# Patient Record
Sex: FEMALE | Race: OTHER | NOT HISPANIC OR LATINO | URBAN - METROPOLITAN AREA
[De-identification: names, ages, dates, MRNs, and addresses within clinical notes are randomized per-mention and may not be internally consistent; named-entity substitution may affect disease eponyms.]

---

## 2019-05-02 ENCOUNTER — INPATIENT (INPATIENT)
Facility: HOSPITAL | Age: 39
LOS: 3 days | Discharge: HOME | DRG: 378 | End: 2019-05-06
Attending: FAMILY MEDICINE | Admitting: INTERNAL MEDICINE
Payer: COMMERCIAL

## 2019-05-02 VITALS
HEART RATE: 125 BPM | OXYGEN SATURATION: 100 % | SYSTOLIC BLOOD PRESSURE: 92 MMHG | TEMPERATURE: 99 F | WEIGHT: 125 LBS | RESPIRATION RATE: 16 BRPM | DIASTOLIC BLOOD PRESSURE: 64 MMHG | HEIGHT: 60 IN

## 2019-05-02 DIAGNOSIS — Z29.9 ENCOUNTER FOR PROPHYLACTIC MEASURES, UNSPECIFIED: ICD-10-CM

## 2019-05-02 DIAGNOSIS — K92.2 GASTROINTESTINAL HEMORRHAGE, UNSPECIFIED: ICD-10-CM

## 2019-05-02 DIAGNOSIS — R11.10 VOMITING, UNSPECIFIED: ICD-10-CM

## 2019-05-02 DIAGNOSIS — E78.2 MIXED HYPERLIPIDEMIA: ICD-10-CM

## 2019-05-02 DIAGNOSIS — R73.9 HYPERGLYCEMIA, UNSPECIFIED: ICD-10-CM

## 2019-05-02 DIAGNOSIS — R10.9 UNSPECIFIED ABDOMINAL PAIN: ICD-10-CM

## 2019-05-02 DIAGNOSIS — E87.1 HYPO-OSMOLALITY AND HYPONATREMIA: ICD-10-CM

## 2019-05-02 LAB
ALBUMIN SERPL ELPH-MCNC: 3.5 G/DL — SIGNIFICANT CHANGE UP (ref 3.3–5)
ALP SERPL-CCNC: 82 U/L — SIGNIFICANT CHANGE UP (ref 40–120)
ALT FLD-CCNC: SIGNIFICANT CHANGE UP U/L (ref 12–78)
AMYLASE P1 CFR SERPL: 28 U/L — SIGNIFICANT CHANGE UP (ref 25–115)
ANION GAP SERPL CALC-SCNC: 21 MMOL/L — HIGH (ref 5–17)
AST SERPL-CCNC: SIGNIFICANT CHANGE UP U/L (ref 15–37)
BASOPHILS # BLD AUTO: 0.01 K/UL — SIGNIFICANT CHANGE UP (ref 0–0.2)
BASOPHILS NFR BLD AUTO: 0.3 % — SIGNIFICANT CHANGE UP (ref 0–2)
BILIRUB SERPL-MCNC: 1.2 MG/DL — SIGNIFICANT CHANGE UP (ref 0.2–1.2)
BUN SERPL-MCNC: 12 MG/DL — SIGNIFICANT CHANGE UP (ref 7–23)
CALCIUM SERPL-MCNC: SIGNIFICANT CHANGE UP MG/DL (ref 8.5–10.1)
CHLORIDE SERPL-SCNC: 95 MMOL/L — LOW (ref 96–108)
CO2 SERPL-SCNC: 13 MMOL/L — LOW (ref 22–31)
CREAT SERPL-MCNC: 0.53 MG/DL — SIGNIFICANT CHANGE UP (ref 0.5–1.3)
EOSINOPHIL # BLD AUTO: 0.02 K/UL — SIGNIFICANT CHANGE UP (ref 0–0.5)
EOSINOPHIL NFR BLD AUTO: 0.6 % — SIGNIFICANT CHANGE UP (ref 0–6)
FLU A RESULT: SIGNIFICANT CHANGE UP
FLU A RESULT: SIGNIFICANT CHANGE UP
FLUAV AG NPH QL: SIGNIFICANT CHANGE UP
FLUBV AG NPH QL: SIGNIFICANT CHANGE UP
GLUCOSE SERPL-MCNC: 194 MG/DL — HIGH (ref 70–99)
HCG SERPL-ACNC: <1 MIU/ML — SIGNIFICANT CHANGE UP
HCT VFR BLD CALC: 27.6 % — LOW (ref 34.5–45)
HCT VFR BLD CALC: 37.3 % — SIGNIFICANT CHANGE UP (ref 34.5–45)
HGB BLD-MCNC: 12.5 G/DL — SIGNIFICANT CHANGE UP (ref 11.5–15.5)
HGB BLD-MCNC: 9 G/DL — LOW (ref 11.5–15.5)
IMM GRANULOCYTES NFR BLD AUTO: 0 % — SIGNIFICANT CHANGE UP (ref 0–1.5)
LACTATE SERPL-SCNC: 0.5 MMOL/L — LOW (ref 0.7–2)
LIDOCAIN IGE QN: 154 U/L — SIGNIFICANT CHANGE UP (ref 73–393)
LYMPHOCYTES # BLD AUTO: 0.21 K/UL — LOW (ref 1–3.3)
LYMPHOCYTES # BLD AUTO: 5.8 % — LOW (ref 13–44)
MCHC RBC-ENTMCNC: 32.5 PG — SIGNIFICANT CHANGE UP (ref 27–34)
MCHC RBC-ENTMCNC: 32.6 GM/DL — SIGNIFICANT CHANGE UP (ref 32–36)
MCHC RBC-ENTMCNC: 32.9 PG — SIGNIFICANT CHANGE UP (ref 27–34)
MCHC RBC-ENTMCNC: 33.5 GM/DL — SIGNIFICANT CHANGE UP (ref 32–36)
MCV RBC AUTO: 98.2 FL — SIGNIFICANT CHANGE UP (ref 80–100)
MCV RBC AUTO: 99.6 FL — SIGNIFICANT CHANGE UP (ref 80–100)
MONOCYTES # BLD AUTO: 0.34 K/UL — SIGNIFICANT CHANGE UP (ref 0–0.9)
MONOCYTES NFR BLD AUTO: 9.4 % — SIGNIFICANT CHANGE UP (ref 2–14)
NEUTROPHILS # BLD AUTO: 3.03 K/UL — SIGNIFICANT CHANGE UP (ref 1.8–7.4)
NEUTROPHILS NFR BLD AUTO: 83.3 % — HIGH (ref 43–77)
NRBC # BLD: 0 /100 WBCS — SIGNIFICANT CHANGE UP (ref 0–0)
NRBC # BLD: 0 /100 WBCS — SIGNIFICANT CHANGE UP (ref 0–0)
PLATELET # BLD AUTO: 139 K/UL — LOW (ref 150–400)
PLATELET # BLD AUTO: 144 K/UL — LOW (ref 150–400)
POTASSIUM SERPL-MCNC: 3.6 MMOL/L — SIGNIFICANT CHANGE UP (ref 3.5–5.3)
POTASSIUM SERPL-SCNC: 3.6 MMOL/L — SIGNIFICANT CHANGE UP (ref 3.5–5.3)
PROT SERPL-MCNC: 6.6 G/DL — SIGNIFICANT CHANGE UP (ref 6–8.3)
RBC # BLD: 2.77 M/UL — LOW (ref 3.8–5.2)
RBC # BLD: 3.8 M/UL — SIGNIFICANT CHANGE UP (ref 3.8–5.2)
RBC # FLD: 13.5 % — SIGNIFICANT CHANGE UP (ref 10.3–14.5)
RBC # FLD: 13.8 % — SIGNIFICANT CHANGE UP (ref 10.3–14.5)
RSV RESULT: SIGNIFICANT CHANGE UP
RSV RNA RESP QL NAA+PROBE: SIGNIFICANT CHANGE UP
SODIUM SERPL-SCNC: 129 MMOL/L — LOW (ref 135–145)
WBC # BLD: 3.71 K/UL — LOW (ref 3.8–10.5)
WBC # BLD: 4.36 K/UL — SIGNIFICANT CHANGE UP (ref 3.8–10.5)
WBC # FLD AUTO: 3.71 K/UL — LOW (ref 3.8–10.5)
WBC # FLD AUTO: 4.36 K/UL — SIGNIFICANT CHANGE UP (ref 3.8–10.5)

## 2019-05-02 PROCEDURE — 99223 1ST HOSP IP/OBS HIGH 75: CPT | Mod: GC,AI

## 2019-05-02 PROCEDURE — 74177 CT ABD & PELVIS W/CONTRAST: CPT | Mod: 26

## 2019-05-02 PROCEDURE — 99285 EMERGENCY DEPT VISIT HI MDM: CPT

## 2019-05-02 RX ORDER — IOHEXOL 300 MG/ML
30 INJECTION, SOLUTION INTRAVENOUS ONCE
Qty: 0 | Refills: 0 | Status: COMPLETED | OUTPATIENT
Start: 2019-05-02 | End: 2019-05-02

## 2019-05-02 RX ORDER — SUCRALFATE 1 G
1 TABLET ORAL
Qty: 0 | Refills: 0 | Status: DISCONTINUED | OUTPATIENT
Start: 2019-05-02 | End: 2019-05-06

## 2019-05-02 RX ORDER — SODIUM CHLORIDE 9 MG/ML
1000 INJECTION INTRAMUSCULAR; INTRAVENOUS; SUBCUTANEOUS ONCE
Qty: 0 | Refills: 0 | Status: COMPLETED | OUTPATIENT
Start: 2019-05-02 | End: 2019-05-02

## 2019-05-02 RX ORDER — PANTOPRAZOLE SODIUM 20 MG/1
40 TABLET, DELAYED RELEASE ORAL ONCE
Qty: 0 | Refills: 0 | Status: DISCONTINUED | OUTPATIENT
Start: 2019-05-02 | End: 2019-05-02

## 2019-05-02 RX ORDER — PANTOPRAZOLE SODIUM 20 MG/1
40 TABLET, DELAYED RELEASE ORAL
Qty: 0 | Refills: 0 | Status: DISCONTINUED | OUTPATIENT
Start: 2019-05-02 | End: 2019-05-02

## 2019-05-02 RX ORDER — FAMOTIDINE 10 MG/ML
20 INJECTION INTRAVENOUS ONCE
Qty: 0 | Refills: 0 | Status: COMPLETED | OUTPATIENT
Start: 2019-05-02 | End: 2019-05-02

## 2019-05-02 RX ORDER — MORPHINE SULFATE 50 MG/1
2 CAPSULE, EXTENDED RELEASE ORAL EVERY 6 HOURS
Qty: 0 | Refills: 0 | Status: DISCONTINUED | OUTPATIENT
Start: 2019-05-02 | End: 2019-05-04

## 2019-05-02 RX ORDER — SODIUM CHLORIDE 9 MG/ML
1000 INJECTION INTRAMUSCULAR; INTRAVENOUS; SUBCUTANEOUS
Qty: 0 | Refills: 0 | Status: DISCONTINUED | OUTPATIENT
Start: 2019-05-02 | End: 2019-05-02

## 2019-05-02 RX ORDER — ACETAMINOPHEN 500 MG
650 TABLET ORAL EVERY 6 HOURS
Qty: 0 | Refills: 0 | Status: DISCONTINUED | OUTPATIENT
Start: 2019-05-02 | End: 2019-05-06

## 2019-05-02 RX ORDER — MORPHINE SULFATE 50 MG/1
1 CAPSULE, EXTENDED RELEASE ORAL EVERY 6 HOURS
Qty: 0 | Refills: 0 | Status: DISCONTINUED | OUTPATIENT
Start: 2019-05-02 | End: 2019-05-04

## 2019-05-02 RX ORDER — CIPROFLOXACIN LACTATE 400MG/40ML
400 VIAL (ML) INTRAVENOUS ONCE
Qty: 0 | Refills: 0 | Status: COMPLETED | OUTPATIENT
Start: 2019-05-02 | End: 2019-05-02

## 2019-05-02 RX ORDER — ONDANSETRON 8 MG/1
4 TABLET, FILM COATED ORAL EVERY 6 HOURS
Qty: 0 | Refills: 0 | Status: DISCONTINUED | OUTPATIENT
Start: 2019-05-02 | End: 2019-05-06

## 2019-05-02 RX ORDER — METRONIDAZOLE 500 MG
TABLET ORAL
Qty: 0 | Refills: 0 | Status: DISCONTINUED | OUTPATIENT
Start: 2019-05-02 | End: 2019-05-05

## 2019-05-02 RX ORDER — METRONIDAZOLE 500 MG
500 TABLET ORAL EVERY 8 HOURS
Qty: 0 | Refills: 0 | Status: DISCONTINUED | OUTPATIENT
Start: 2019-05-03 | End: 2019-05-05

## 2019-05-02 RX ORDER — METRONIDAZOLE 500 MG
500 TABLET ORAL ONCE
Qty: 0 | Refills: 0 | Status: COMPLETED | OUTPATIENT
Start: 2019-05-02 | End: 2019-05-02

## 2019-05-02 RX ORDER — CIPROFLOXACIN LACTATE 400MG/40ML
VIAL (ML) INTRAVENOUS
Qty: 0 | Refills: 0 | Status: DISCONTINUED | OUTPATIENT
Start: 2019-05-02 | End: 2019-05-05

## 2019-05-02 RX ORDER — SODIUM CHLORIDE 9 MG/ML
1000 INJECTION INTRAMUSCULAR; INTRAVENOUS; SUBCUTANEOUS
Qty: 0 | Refills: 0 | Status: DISCONTINUED | OUTPATIENT
Start: 2019-05-02 | End: 2019-05-06

## 2019-05-02 RX ORDER — LANOLIN ALCOHOL/MO/W.PET/CERES
5 CREAM (GRAM) TOPICAL ONCE
Qty: 0 | Refills: 0 | Status: COMPLETED | OUTPATIENT
Start: 2019-05-02 | End: 2019-05-02

## 2019-05-02 RX ORDER — FAMOTIDINE 10 MG/ML
20 INJECTION INTRAVENOUS
Qty: 0 | Refills: 0 | Status: DISCONTINUED | OUTPATIENT
Start: 2019-05-02 | End: 2019-05-06

## 2019-05-02 RX ORDER — CIPROFLOXACIN LACTATE 400MG/40ML
400 VIAL (ML) INTRAVENOUS EVERY 12 HOURS
Qty: 0 | Refills: 0 | Status: DISCONTINUED | OUTPATIENT
Start: 2019-05-03 | End: 2019-05-05

## 2019-05-02 RX ADMIN — SODIUM CHLORIDE 1000 MILLILITER(S): 9 INJECTION INTRAMUSCULAR; INTRAVENOUS; SUBCUTANEOUS at 13:44

## 2019-05-02 RX ADMIN — Medication 200 MILLIGRAM(S): at 21:32

## 2019-05-02 RX ADMIN — Medication 650 MILLIGRAM(S): at 17:36

## 2019-05-02 RX ADMIN — Medication 650 MILLIGRAM(S): at 23:44

## 2019-05-02 RX ADMIN — IOHEXOL 30 MILLILITER(S): 300 INJECTION, SOLUTION INTRAVENOUS at 14:15

## 2019-05-02 RX ADMIN — Medication 5 MILLIGRAM(S): at 22:00

## 2019-05-02 RX ADMIN — Medication 1 GRAM(S): at 17:29

## 2019-05-02 RX ADMIN — SODIUM CHLORIDE 1000 MILLILITER(S): 9 INJECTION INTRAMUSCULAR; INTRAVENOUS; SUBCUTANEOUS at 12:44

## 2019-05-02 RX ADMIN — FAMOTIDINE 20 MILLIGRAM(S): 10 INJECTION INTRAVENOUS at 12:44

## 2019-05-02 RX ADMIN — Medication 100 MILLIGRAM(S): at 19:58

## 2019-05-02 RX ADMIN — SODIUM CHLORIDE 1000 MILLILITER(S): 9 INJECTION INTRAMUSCULAR; INTRAVENOUS; SUBCUTANEOUS at 14:40

## 2019-05-02 RX ADMIN — Medication 650 MILLIGRAM(S): at 18:06

## 2019-05-02 RX ADMIN — SODIUM CHLORIDE 100 MILLILITER(S): 9 INJECTION INTRAMUSCULAR; INTRAVENOUS; SUBCUTANEOUS at 21:34

## 2019-05-02 RX ADMIN — SODIUM CHLORIDE 1000 MILLILITER(S): 9 INJECTION INTRAMUSCULAR; INTRAVENOUS; SUBCUTANEOUS at 13:40

## 2019-05-02 RX ADMIN — FAMOTIDINE 20 MILLIGRAM(S): 10 INJECTION INTRAVENOUS at 21:32

## 2019-05-02 NOTE — H&P ADULT - NSHPOUTPATIENTPROVIDERS_GEN_ALL_CORE
[facility]. Morris at [facility]  Pharmacy: [facility]

## 2019-05-02 NOTE — H&P ADULT - NSHPPHYSICALEXAM_GEN_ALL_CORE
Physical Exam:  General: Well developed, well nourished, NAD  HEENT: NCAT, PERRLA, EOMI bl, moist mucous membranes   Neck: Supple, nontender, no mass  Neurology: A&Ox3, nonfocal, CN II-XII intact, sensation intact, no gait abnormalities   Respiratory: CTA B/L, No W/R/R  CV: RRR, +S1/S2, no murmurs, rubs or gallops  Abdominal: Soft, NT, ND +BSx4  Extremities: No C/C/E, + peripheral pulses  MSK: Normal ROM, no joint erythema or warmth, no joint swelling   Skin: warm, dry, tattoo on back, umbilicus jewelry

## 2019-05-02 NOTE — H&P ADULT - HISTORY OF PRESENT ILLNESS
38 F PMHx of elevated triglycerides presents to ED c/o of abdominal pain. Patient states she awoke at 8:00 a.m., and noticed some moderate abdominal pain and took a total 400 mg advil. An hour later, she felt feverish (but did not take temp) with shaking, body aches and chills, and she ate a small amount of her sandwich but vomited 30 minutes later. Patient vomited a second time hours later, and this time noticed a cup of bright red blood. Denies recent illness, travel, eating raw meat/fish, cough, SOB, chest pain, diarrhea or constipation. Note patient unsure of LMP due to Mirena placement 3 years ago and only has irregular spotting on occasion.     In the ED:  -VS: T: 98.9 HR: 125-> 118 BP: 92/64 -> 107/72  -s/p 2L NS bolus  -20 mg IV famotadine  -EKG Sinus tach @ 120. Personally reviewed: No ST or T wave changes, no S1Q3T3, no LVH 38 F PMHx of elevated triglycerides presents to ED c/o of abdominal pain. Patient states she awoke at 8:00 a.m., and noticed some moderate abdominal pain and took a total 400 mg advil. An hour later, she felt feverish (but did not take temp) with shaking, body aches and chills, and she ate a small amount of her sandwich but vomited 30 minutes later. Patient vomited a second time hours later, and this time noticed a cup of bright red blood. Denies recent illness, travel, eating raw meat/fish, cough, SOB, chest pain, diarrhea or constipation. Note patient unsure of LMP due to Mirena placement 3 years ago and only has irregular spotting on occasion.     In the ED:  -VS: T: 98.9 HR: 125-> 118 BP: 92/64 -> 107/72 WBC: wnl  -BMP wnl except Na: 129  -s/p 2L NS bolus  -20 mg IV famotadine  -EKG Sinus tach @ 120. Personally reviewed: No ST or T wave changes, no S1Q3T3, no LVH  -Flu negative

## 2019-05-02 NOTE — CONSULT NOTE ADULT - SUBJECTIVE AND OBJECTIVE BOX
Chief Complaint:  Patient is a 38y old  Female who presents with a chief complaint of  abd pain    Seasonal allergies  No significant past surgical history     HPI: 37yo female presented to ed w abd pain and possible coffee ground emesis.    recent vs/labs/imaging reviewed- afebrile tachy soft bps  labs pending  no imaging      No Known Allergies      sodium chloride 0.9% Bolus 1000 milliLiter(s) IV Bolus once        FAMILY HISTORY:        Review of Systems:    General:  No wt loss, fevers, chills, night sweats, fatigue  Eyes:  Good vision, no reported pain  ENT:  No sore throat, pain, runny nose, dysphagia  CV:  No pain, palpitations, no lightheadedness  Resp:  No dyspnea, cough, tachypnea, wheezing  GI: see above  :  No pain, bleeding, incontinence, nocturia  Muscle:  No pain, weakness  Neuro:  No weakness, tingling, memory problems  Psych:  No fatigue, insomnia, mood problems, depression  Endocrine:  No polyuria, polydypsia, cold/heat intolerance  Heme:  No petechiae, ecchymosis, easy bruisability  Skin:  No rash, tattoos, scars, edema    Relevant Family History:   n/c    Relevant Social History: n/c      Physical Exam:    Vital Signs:  Vital Signs Last 24 Hrs  T(C): 37.2 (02 May 2019 11:52), Max: 37.2 (02 May 2019 11:52)  T(F): 98.9 (02 May 2019 11:52), Max: 98.9 (02 May 2019 11:52)  HR: 125 (02 May 2019 11:52) (125 - 125)  BP: 92/64 (02 May 2019 11:52) (92/64 - 92/64)  BP(mean): --  RR: 16 (02 May 2019 11:52) (16 - 16)  SpO2: 100% (02 May 2019 11:52) (100% - 100%)  Daily Height in cm: 152.4 (02 May 2019 11:52)    Daily     General:  Appears stated age, well-groomed, nad  HEENT:  NC/AT,  conjunctivae clear and pink, no thyromegaly, nodules, adenopathy, no JVD  Chest:  Full & symmetric excursion, no increased effort, breath sounds clear  Cardiovascular:  Regular rhythm, S1, S2, no murmur/rub/S3/S4, no abdominal bruit, no edema  Abdomen:  Soft, non-tender, non-distended, normoactive bowel sounds,  no masses ,no hepatosplenomeagaly, no signs of chronic liver disease  Extremities:  no cyanosis,clubbing or edema  Skin:  No rash/erythema/ecchymoses/petechiae/wounds/abscess/warm/dry  Neuro/Psych:  A&O  , no asterixis, no tremor, no encephalopathy    Laboratory:        05-02    129<L>  |  95<L>  |  x   ----------------------------<  194<H>  3.6   |  13<L>  |  0.53              Amylase Serum28      Lipase [id number]       Ammonia--    Imaging:    none Chief Complaint:  Patient is a 38y old  Female who presents with a chief complaint of  abd pain    Seasonal allergies  No significant past surgical history     HPI: 39 yo healthy female, developed body aches x one day and then this morning developed lower abdominal cramps followed by vomiting of all of breakfast. Shortly thereafter took Motrin followed by some more Advil. Went to work and then vomited few times with BRB admixed with some of her food from breakfast. At this time patient is feeling a little weak. denies any fever, chills, cough, diarrhea, melena, brbpr and no cough or SOB. Never had this before.    gi consulted for ?hematemesis and abd pain. chart reviewed. pt seen and examined. yesterday in [facility], woke up this morning felt overall unwell w chills and body aches. took nsaid had breakfast went to work, had 1 episode of emesis mostly food. took motrin and later vomited again this time brb. c/o associated nausea and lq abd cramps. last bm yesterday and normal. 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 denies fevers/d/c. denies prior gi hx, known pud, liver/gb/pancreatic dz. denies recent travel, hospitalizations, abx use, dietary changes (states she usually eats light, grazes, bland foods). no prior abd sx. has never had egd/colon. denies toxic habits, only social etoh use, no recent inc use. fhx nc. takes nsaids prn for pain, no ac/anti plts. upon eval, no further vomiting or abd cramps, but c/o chills and visibly rigoring.    recent vs/labs/imaging reviewed- afebrile tachy soft bps  labs pending  no imaging      No Known Allergies      sodium chloride 0.9% Bolus 1000 milliLiter(s) IV Bolus once        FAMILY HISTORY:        Review of Systems:    General:   chills  Eyes:  Good vision, no reported pain  ENT:  No sore throat, pain, runny nose, dysphagia  CV:  No pain, palpitations, no lightheadedness  Resp:  No dyspnea, cough, tachypnea, wheezing  GI: see above  :  No pain, bleeding, incontinence, nocturia  Muscle:  No pain, weakness  Neuro:  No weakness, tingling, memory problems  Psych:  No fatigue, insomnia, mood problems, depression  Endocrine:  No polyuria, polydypsia, cold/heat intolerance  Heme:  No petechiae, ecchymosis, easy bruisability  Skin:  No rash, tattoos, scars, edema    Relevant Family History:   n/c    Relevant Social History: n/c      Physical Exam:    Vital Signs:  Vital Signs Last 24 Hrs  T(C): 37.2 (02 May 2019 11:52), Max: 37.2 (02 May 2019 11:52)  T(F): 98.9 (02 May 2019 11:52), Max: 98.9 (02 May 2019 11:52)  HR: 125 (02 May 2019 11:52) (125 - 125)  BP: 92/64 (02 May 2019 11:52) (92/64 - 92/64)  BP(mean): --  RR: 16 (02 May 2019 11:52) (16 - 16)  SpO2: 100% (02 May 2019 11:52) (100% - 100%)  Daily Height in cm: 152.4 (02 May 2019 11:52)    Daily     General:  uncomfortable appearing +rigors  HEENT:  NC/AT  Chest:  dec bs  Cardiovascular:  s1s2 tachy  Abdomen:  Soft, epigastric ttp mild dt +bs  Extremities:  no edema  Skin:  No rash  Neuro/Psych:  A&O x3    Laboratory:        05-02    129<L>  |  95<L>  |  x   ----------------------------<  194<H>  3.6   |  13<L>  |  0.53              Amylase Serum28      Lipase [id number]       Ammonia--    Imaging:    pending

## 2019-05-02 NOTE — ED PROVIDER NOTE - CONSTITUTIONAL, MLM
normal... Well appearing, young female, well nourished, awake, alert, oriented to person, place, time/situation and in mild apparent distress.

## 2019-05-02 NOTE — H&P ADULT - NSHPREVIEWOFSYSTEMS_GEN_ALL_CORE
Constitutional: denies fever, diaphoresis admits chills  HEENT: denies blurry vision, difficulty hearing  Respiratory: denies SOB, JANG, cough, sputum production, wheezing, hemoptysis  Cardiovascular: denies CP, palpitations, edema  Gastrointestinal: Admits nausea, admits vomiting admits abdominal pain denies diarrhea, constipation   Genitourinary: denies dysuria, frequency, urgency, hematuria   Musculoskeletal: denies myalgias, joint swelling, muscle weakness  Neurologic: denies headache, weakness, dizziness, paresthesias, numbness/tingling  Psychiatric: denies feeling anxious, depressed, suicidal, homicidal thoughts  Hematology/Oncology: denies bruising, tender or enlarged lymph nodes   ROS negative except as noted above

## 2019-05-02 NOTE — ED ADULT NURSE NOTE - NSIMPLEMENTINTERV_GEN_ALL_ED
Implemented All Universal Safety Interventions:  [place] to call system. Call bell, personal items and telephone within reach. Instruct patient to call for assistance. Room bathroom lighting operational. Non-slip footwear when patient is off stretcher. Physically safe environment: no spills, clutter or unnecessary equipment. Stretcher in lowest position, wheels locked, appropriate side rails in place.

## 2019-05-02 NOTE — H&P ADULT - PROBLEM SELECTOR PLAN 1
-Patient Hb dropped from 12.5 to 9.0  -F/U STAT CT abd pelvis with contrast   -Diet NPO  -GI sideridis, endoscopy in a.m.   -Type and Screen  -Zofran q6h PRN nausea  -Pain scale: 650 tylenol mild, 1 mg IV morphine mod, 2 mg IV severe  -F/U fecal occult  -Continue pepcid 20 mg IV daily, carafate -Patient Hb dropped from 12.5 to 9.0  -F/U STAT CT abd pelvis with contrast   -Diet NPO  -GI sideridis, endoscopy in a.m.   -Type and Screen  -Zofran q6h PRN nausea  -Pain scale: 650 tylenol mild, 1 mg IV morphine mod, 2 mg IV severe  -F/U fecal occult  -Continue pepcid 20 mg IV daily, carafate  -Note flu negative

## 2019-05-02 NOTE — ED ADULT NURSE NOTE - OBJECTIVE STATEMENT
Pt presents to the Ed, s/p lower abd cramping after drinking a bottle of water ( her routine), ate toast, took Advil for the pain, went to work, felt achy all over, denies cough, then took a Motrin, vomited once, undigested food, brown. Pt then vomited once again, noticed blood. EKG done, pt placed on cardiac monitor. Call bell within reach.

## 2019-05-02 NOTE — ED PROVIDER NOTE - OBJECTIVE STATEMENT
37 yo healthy female, developed body aches x one day and then this morning developed lower abdominal cramps followed by vomiting of all of breakfast. Shortly thereafter took Motrin followed by some more Advil. Went to work and then vomited few times with BRB admixed with some of her food from breakfast. At this time patient is feeling a little weak. denies any fever, chills, cough, diarrhea, melena, brbpr and no cough or SOB. Never had this before.

## 2019-05-02 NOTE — CONSULT NOTE ADULT - PROBLEM SELECTOR RECOMMENDATION 2
reported hematemesis, no further episodes while in ed  f/u cbc, f/u ct a/p  monitor h/h, transfuse prn  cont ppi/carafate  hold ac asa nsaids  may need egd  icu eval if decompensates    above plan dw attg, agreeable; dw ed

## 2019-05-02 NOTE — H&P ADULT - PROBLEM SELECTOR PLAN 2
-Patient states she was on atorvastatin (20 mg) but she was nauseated from it so stopped  -F/U Lipid panel

## 2019-05-02 NOTE — H&P ADULT - NSHPSOCIALHISTORY_GEN_ALL_CORE
smoked 1/4 ppd for 3 years. No etoh, illicit drug use. Ambulates independently. No recent travel. 3 children . , and currently engaged.       HCM: No recent pap smear in 6 years, Mirena placement 3 years ago.

## 2019-05-02 NOTE — ED ADULT TRIAGE NOTE - CHIEF COMPLAINT QUOTE
"I woke up this morning with severe cramping in my stomach and my whole body was really cold. I just threw up and there was blood"

## 2019-05-02 NOTE — ED PROVIDER NOTE - CLINICAL SUMMARY MEDICAL DECISION MAKING FREE TEXT BOX
Lower abdominal pains with hematemesis requiring evaluation, labs and IVFS/Meds and GI consult with Dr. Madrid (Adriane andrade)

## 2019-05-03 DIAGNOSIS — K92.0 HEMATEMESIS: ICD-10-CM

## 2019-05-03 DIAGNOSIS — K52.9 NONINFECTIVE GASTROENTERITIS AND COLITIS, UNSPECIFIED: ICD-10-CM

## 2019-05-03 DIAGNOSIS — E78.1 PURE HYPERGLYCERIDEMIA: ICD-10-CM

## 2019-05-03 LAB
ANION GAP SERPL CALC-SCNC: 13 MMOL/L — SIGNIFICANT CHANGE UP (ref 5–17)
ANION GAP SERPL CALC-SCNC: 14 MMOL/L — SIGNIFICANT CHANGE UP (ref 5–17)
APPEARANCE UR: CLEAR — SIGNIFICANT CHANGE UP
BILIRUB UR-MCNC: NEGATIVE — SIGNIFICANT CHANGE UP
BUN SERPL-MCNC: 4 MG/DL — LOW (ref 7–23)
BUN SERPL-MCNC: 6 MG/DL — LOW (ref 7–23)
CALCIUM SERPL-MCNC: 6.2 MG/DL — CRITICAL LOW (ref 8.5–10.1)
CALCIUM SERPL-MCNC: 7.2 MG/DL — LOW (ref 8.5–10.1)
CHLORIDE SERPL-SCNC: 100 MMOL/L — SIGNIFICANT CHANGE UP (ref 96–108)
CHLORIDE SERPL-SCNC: 99 MMOL/L — SIGNIFICANT CHANGE UP (ref 96–108)
CHOLEST SERPL-MCNC: 789 MG/DL — HIGH (ref 10–199)
CO2 SERPL-SCNC: 20 MMOL/L — LOW (ref 22–31)
CO2 SERPL-SCNC: 21 MMOL/L — LOW (ref 22–31)
COLOR SPEC: YELLOW — SIGNIFICANT CHANGE UP
CREAT SERPL-MCNC: 0.62 MG/DL — SIGNIFICANT CHANGE UP (ref 0.5–1.3)
CREAT SERPL-MCNC: 0.63 MG/DL — SIGNIFICANT CHANGE UP (ref 0.5–1.3)
DIFF PNL FLD: NEGATIVE — SIGNIFICANT CHANGE UP
ETHANOL SERPL-MCNC: <12 MG/DL — HIGH (ref 0–10)
GLUCOSE SERPL-MCNC: 148 MG/DL — HIGH (ref 70–99)
GLUCOSE SERPL-MCNC: 154 MG/DL — HIGH (ref 70–99)
GLUCOSE UR QL: NEGATIVE — SIGNIFICANT CHANGE UP
HBA1C BLD-MCNC: 5.1 % — SIGNIFICANT CHANGE UP (ref 4–5.6)
HCG UR QL: NEGATIVE — SIGNIFICANT CHANGE UP
HCT VFR BLD CALC: 30.9 % — LOW (ref 34.5–45)
HDLC SERPL-MCNC: 5 MG/DL — LOW
HGB BLD-MCNC: 11.1 G/DL — LOW (ref 11.5–15.5)
KETONES UR-MCNC: ABNORMAL
LEUKOCYTE ESTERASE UR-ACNC: ABNORMAL
LIPID PNL WITH DIRECT LDL SERPL: SIGNIFICANT CHANGE UP MG/DL
MCHC RBC-ENTMCNC: 34.8 PG — HIGH (ref 27–34)
MCHC RBC-ENTMCNC: 35.9 GM/DL — SIGNIFICANT CHANGE UP (ref 32–36)
MCV RBC AUTO: 96.9 FL — SIGNIFICANT CHANGE UP (ref 80–100)
NITRITE UR-MCNC: NEGATIVE — SIGNIFICANT CHANGE UP
NRBC # BLD: 0 /100 WBCS — SIGNIFICANT CHANGE UP (ref 0–0)
PH UR: 5 — SIGNIFICANT CHANGE UP (ref 5–8)
PLATELET # BLD AUTO: 129 K/UL — LOW (ref 150–400)
POTASSIUM SERPL-MCNC: 3 MMOL/L — LOW (ref 3.5–5.3)
POTASSIUM SERPL-MCNC: 3.1 MMOL/L — LOW (ref 3.5–5.3)
POTASSIUM SERPL-SCNC: 3 MMOL/L — LOW (ref 3.5–5.3)
POTASSIUM SERPL-SCNC: 3.1 MMOL/L — LOW (ref 3.5–5.3)
PROT UR-MCNC: NEGATIVE — SIGNIFICANT CHANGE UP
RBC # BLD: 3.19 M/UL — LOW (ref 3.8–5.2)
RBC # FLD: 13.9 % — SIGNIFICANT CHANGE UP (ref 10.3–14.5)
SODIUM SERPL-SCNC: 133 MMOL/L — LOW (ref 135–145)
SODIUM SERPL-SCNC: 134 MMOL/L — LOW (ref 135–145)
SP GR SPEC: 1.01 — SIGNIFICANT CHANGE UP (ref 1.01–1.02)
TOTAL CHOLESTEROL/HDL RATIO MEASUREMENT: 157.8 RATIO — HIGH (ref 3.3–7.1)
TRIGL SERPL-MCNC: 4232 MG/DL — HIGH (ref 10–149)
TRIGL SERPL-MCNC: 5150 MG/DL — HIGH (ref 10–149)
UROBILINOGEN FLD QL: NEGATIVE — SIGNIFICANT CHANGE UP
WBC # BLD: 4.79 K/UL — SIGNIFICANT CHANGE UP (ref 3.8–10.5)
WBC # FLD AUTO: 4.79 K/UL — SIGNIFICANT CHANGE UP (ref 3.8–10.5)

## 2019-05-03 PROCEDURE — 99291 CRITICAL CARE FIRST HOUR: CPT

## 2019-05-03 PROCEDURE — 99222 1ST HOSP IP/OBS MODERATE 55: CPT

## 2019-05-03 PROCEDURE — 99233 SBSQ HOSP IP/OBS HIGH 50: CPT | Mod: GC

## 2019-05-03 RX ORDER — LACTOBACILLUS ACIDOPHILUS 100MM CELL
1 CAPSULE ORAL THREE TIMES A DAY
Qty: 0 | Refills: 0 | Status: DISCONTINUED | OUTPATIENT
Start: 2019-05-03 | End: 2019-05-06

## 2019-05-03 RX ORDER — LANOLIN ALCOHOL/MO/W.PET/CERES
5 CREAM (GRAM) TOPICAL ONCE
Qty: 0 | Refills: 0 | Status: COMPLETED | OUTPATIENT
Start: 2019-05-03 | End: 2019-05-03

## 2019-05-03 RX ORDER — POTASSIUM CHLORIDE 20 MEQ
40 PACKET (EA) ORAL EVERY 4 HOURS
Qty: 0 | Refills: 0 | Status: COMPLETED | OUTPATIENT
Start: 2019-05-03 | End: 2019-05-03

## 2019-05-03 RX ORDER — FENOFIBRATE,MICRONIZED 130 MG
145 CAPSULE ORAL DAILY
Qty: 0 | Refills: 0 | Status: DISCONTINUED | OUTPATIENT
Start: 2019-05-03 | End: 2019-05-06

## 2019-05-03 RX ORDER — POTASSIUM CHLORIDE 20 MEQ
10 PACKET (EA) ORAL
Qty: 0 | Refills: 0 | Status: COMPLETED | OUTPATIENT
Start: 2019-05-03 | End: 2019-05-03

## 2019-05-03 RX ORDER — CALCIUM GLUCONATE 100 MG/ML
1 VIAL (ML) INTRAVENOUS ONCE
Qty: 0 | Refills: 0 | Status: COMPLETED | OUTPATIENT
Start: 2019-05-03 | End: 2019-05-03

## 2019-05-03 RX ORDER — POTASSIUM PHOSPHATE, MONOBASIC POTASSIUM PHOSPHATE, DIBASIC 236; 224 MG/ML; MG/ML
15 INJECTION, SOLUTION INTRAVENOUS ONCE
Qty: 0 | Refills: 0 | Status: COMPLETED | OUTPATIENT
Start: 2019-05-03 | End: 2019-05-03

## 2019-05-03 RX ORDER — FENOFIBRATE,MICRONIZED 130 MG
48 CAPSULE ORAL DAILY
Qty: 0 | Refills: 0 | Status: DISCONTINUED | OUTPATIENT
Start: 2019-05-03 | End: 2019-05-03

## 2019-05-03 RX ORDER — MAGNESIUM SULFATE 500 MG/ML
2 VIAL (ML) INJECTION ONCE
Qty: 0 | Refills: 0 | Status: COMPLETED | OUTPATIENT
Start: 2019-05-03 | End: 2019-05-03

## 2019-05-03 RX ORDER — OMEGA-3 ACID ETHYL ESTERS 1 G
2 CAPSULE ORAL
Qty: 0 | Refills: 0 | Status: DISCONTINUED | OUTPATIENT
Start: 2019-05-03 | End: 2019-05-06

## 2019-05-03 RX ADMIN — Medication 100 MILLIGRAM(S): at 15:03

## 2019-05-03 RX ADMIN — FAMOTIDINE 20 MILLIGRAM(S): 10 INJECTION INTRAVENOUS at 19:13

## 2019-05-03 RX ADMIN — Medication 1 GRAM(S): at 21:32

## 2019-05-03 RX ADMIN — Medication 100 MILLIEQUIVALENT(S): at 12:32

## 2019-05-03 RX ADMIN — Medication 40 MILLIEQUIVALENT(S): at 10:16

## 2019-05-03 RX ADMIN — Medication 200 GRAM(S): at 07:03

## 2019-05-03 RX ADMIN — Medication 100 MILLIGRAM(S): at 21:32

## 2019-05-03 RX ADMIN — Medication 40 MILLIEQUIVALENT(S): at 15:03

## 2019-05-03 RX ADMIN — Medication 1 TABLET(S): at 15:03

## 2019-05-03 RX ADMIN — Medication 50 GRAM(S): at 10:17

## 2019-05-03 RX ADMIN — Medication 5 MILLIGRAM(S): at 23:34

## 2019-05-03 RX ADMIN — Medication 650 MILLIGRAM(S): at 09:00

## 2019-05-03 RX ADMIN — Medication 200 MILLIGRAM(S): at 06:26

## 2019-05-03 RX ADMIN — Medication 100 MILLIGRAM(S): at 05:22

## 2019-05-03 RX ADMIN — Medication 1 TABLET(S): at 21:32

## 2019-05-03 RX ADMIN — Medication 650 MILLIGRAM(S): at 05:47

## 2019-05-03 RX ADMIN — Medication 2 GRAM(S): at 19:13

## 2019-05-03 RX ADMIN — Medication 100 MILLIEQUIVALENT(S): at 10:15

## 2019-05-03 RX ADMIN — Medication 100 MILLIEQUIVALENT(S): at 07:59

## 2019-05-03 RX ADMIN — Medication 650 MILLIGRAM(S): at 08:00

## 2019-05-03 RX ADMIN — FAMOTIDINE 20 MILLIGRAM(S): 10 INJECTION INTRAVENOUS at 05:21

## 2019-05-03 RX ADMIN — POTASSIUM PHOSPHATE, MONOBASIC POTASSIUM PHOSPHATE, DIBASIC 62.5 MILLIMOLE(S): 236; 224 INJECTION, SOLUTION INTRAVENOUS at 10:17

## 2019-05-03 RX ADMIN — Medication 200 MILLIGRAM(S): at 19:13

## 2019-05-03 RX ADMIN — Medication 145 MILLIGRAM(S): at 15:03

## 2019-05-03 NOTE — DISCHARGE NOTE PROVIDER - NSDCCPCAREPLAN_GEN_ALL_CORE_FT
PRINCIPAL DISCHARGE DIAGNOSIS  Diagnosis: Gastrointestinal hemorrhage, unspecified gastrointestinal hemorrhage type  Assessment and Plan of Treatment:       SECONDARY DISCHARGE DIAGNOSES  Diagnosis: Colitis  Assessment and Plan of Treatment: You were diagnosed with colitis and given antibiotics. Continue medications as prescribed and follow up with your PCP in 1 week.    Diagnosis: Hypertriglyceridemia  Assessment and Plan of Treatment: You were diagnosed with hypertriglyceridemia. Continue medications as prescribed. Follow up with your PCP in 1 week. PRINCIPAL DISCHARGE DIAGNOSIS  Diagnosis: Gastrointestinal hemorrhage, unspecified gastrointestinal hemorrhage type  Assessment and Plan of Treatment: You were admitted to the hospital to rule out a GI bleed. You were seen by GI, Dr. Alanis. An upper endoscopy showed __________. Please continue the current medication regimen as prescribed. Follow up with your primary care doctor, Dr. Morris within one week of discharge.      SECONDARY DISCHARGE DIAGNOSES  Diagnosis: Hypertriglyceridemia  Assessment and Plan of Treatment: You were diagnosed with hypertriglyceridemia. Continue medications as prescribed. Follow up with your PCP within 2 weeks. You will need to have a repeat lipid panel and liver function test at that time.    Diagnosis: Colitis  Assessment and Plan of Treatment: You were diagnosed with colitis and given antibiotics. Continue your antibiotics cipro and Flagyl until 05/07/2019. Follow up with your PCP in 2 weeks PRINCIPAL DISCHARGE DIAGNOSIS  Diagnosis: Gastrointestinal hemorrhage, unspecified gastrointestinal hemorrhage type  Assessment and Plan of Treatment: You were admitted to the hospital to rule out a GI bleed. You were seen by GI, Dr. Alanis. An upper endoscopy showed a hiatal hernia and gastritis (no intervention necessary at this time). Please continue the current medication regimen as prescribed. Follow up with your primary care doctor, Dr. Morris within one week of discharge.      SECONDARY DISCHARGE DIAGNOSES  Diagnosis: Colitis  Assessment and Plan of Treatment: You were diagnosed with colitis and given antibiotics. Continue your antibiotics cipro and Flagyl until 05/07/2019. Follow up with your PCP in 2 weeks    Diagnosis: Hypertriglyceridemia  Assessment and Plan of Treatment: You were diagnosed with hypertriglyceridemia. Continue medications as prescribed. Follow up with your PCP within 2 weeks. You will need to have a repeat lipid panel and liver function test at that time. PRINCIPAL DISCHARGE DIAGNOSIS  Diagnosis: Gastrointestinal hemorrhage, unspecified gastrointestinal hemorrhage type  Assessment and Plan of Treatment: You were admitted to the hospital to rule out a GI bleed. You were seen by GI, Dr. Alanis. An upper endoscopy showed a hiatal hernia and gastritis (no intervention necessary at this time). Please continue the current medication regimen as prescribed. Follow up with your primary care doctor, Dr. Morris within one week of discharge.      SECONDARY DISCHARGE DIAGNOSES  Diagnosis: Colitis  Assessment and Plan of Treatment: You were diagnosed with colitis and given antibiotics. Continue your antibiotics cipro and Flagyl until 05/07/2019. Follow up with your PCP in 1weeks    Diagnosis: Hypertriglyceridemia  Assessment and Plan of Treatment: You were diagnosed with hypertriglyceridemia. Continue medications as prescribed. Follow up with your PCP within  1 to 2 weeks. You will need to have a repeat lipid panel and liver function test at that time.

## 2019-05-03 NOTE — DISCHARGE NOTE PROVIDER - HOSPITAL COURSE
38 F with a PMHX of hypertriglyceridemia presented with abdominal pain and 2 episodes of hemoptysis admitted to telemetry unit for further work up. Cardiology (Dr. Castillo) Consulted given triglycerides >5000. Patient started on fenofibrate and fish oil. GI, Dr. Alanis, consulted given chief complaint of possible GI bleed, with EGD pending. Patient found to be hyponatremic, hypokalemic and hypocalcemic with electrolytes replaced. Patient evaluated by ICU given triglyceride numbers. 38 F PMHx of elevated triglycerides presents to ED c/o of abdominal pain. Patient stated she awoke at 8:00 a.m., and noticed some moderate abdominal pain and took a total 400 mg advil. An hour later, she felt feverish (but did not take temp) with shaking, body aches and chills, and she ate a small amount of her sandwich but vomited 30 minutes later. Patient vomited a second time hours later, and this time noticed a cup of bright red blood. Denied recent illness, travel, eating raw meat/fish, cough, SOB, chest pain, diarrhea or constipation. Note patient unsure of LMP due to Mirena placement 3 years ago and only has irregular spotting on occasion.             The patient was admitted to r/o GI hemorrhage. The patient was seen by GI, Dr. Alanis. The patient was found to be FOBT negative. CT abdomen showed an incidental finding of colitis. She was started on cipro/Flagyl. Stool culture was negative. Diarrhea has since resolved. The patient also complained of hematemesis prior to admission. No episodes noted on this admission. The patient was started on Pepcid, carafate, and Zofran PRN.  The patient tolerated po diet. EGD was performed showing ____________. The patient also has a history of familial hypertriglyceridemia. The patient was started on Lovaza, fenofibrate, gemfibrozil, and a statin. Lipid panel trended downward. LFTs remained within normal limits. Cardiology, Dr. Castillo was consulted for hypertriglyceridemia and cardiac clearance for EGD. The patient was cleared by cardiology. The patient is stable for discharge with close outpatient follow up with her primary care doctor, Dr. Morris        Vital Signs Last 24 Hrs    T(C): 36.6 (06 May 2019 06:00), Max: 36.6 (05 May 2019 12:20)    T(F): 97.9 (06 May 2019 06:00), Max: 97.9 (06 May 2019 06:00)    HR: 66 (06 May 2019 06:00) (63 - 82)    BP: 98/64 (06 May 2019 06:00) (98/64 - 117/83)    BP(mean): --    RR: 17 (06 May 2019 06:00) (16 - 17)    SpO2: 99% (06 May 2019 06:00) (98% - 99%) 38 F PMHx of elevated triglycerides presents to ED c/o of abdominal pain. Patient stated she awoke at 8:00 a.m., and noticed some moderate abdominal pain and took a total 400 mg advil. An hour later, she felt feverish (but did not take temp) with shaking, body aches and chills, and she ate a small amount of her sandwich but vomited 30 minutes later. Patient vomited a second time hours later, and this time noticed a cup of bright red blood. Denied recent illness, travel, eating raw meat/fish, cough, SOB, chest pain, diarrhea or constipation. Note patient unsure of LMP due to Mirena placement 3 years ago and only has irregular spotting on occasion.             The patient was admitted to r/o GI hemorrhage. The patient was seen by GI, Dr. Alanis. The patient was found to be FOBT negative. CT abdomen showed an incidental finding of colitis. She was started on cipro/Flagyl. Stool culture was negative. Diarrhea has since resolved. The patient also complained of hematemesis prior to admission. No episodes noted on this admission. The patient was started on Pepcid, carafate, and Zofran PRN.  The patient tolerated po diet. EGD was performed showing hiatal hernia and gastritis. The patient also has a history of familial hypertriglyceridemia. The patient was started on Lovaza, fenofibrate, gemfibrozil, and a statin. Lipid panel trended downward. LFTs remained within normal limits. Cardiology, Dr. Castillo was consulted for hypertriglyceridemia and cardiac clearance for EGD. The patient was cleared by cardiology. The patient is stable for discharge with close outpatient follow up with her primary care doctor, Dr. Morris        Vital Signs Last 24 Hrs    T(C): 36.6 (06 May 2019 06:00), Max: 36.6 (05 May 2019 12:20)    T(F): 97.9 (06 May 2019 06:00), Max: 97.9 (06 May 2019 06:00)    HR: 66 (06 May 2019 06:00) (63 - 82)    BP: 98/64 (06 May 2019 06:00) (98/64 - 117/83)    BP(mean): --    RR: 17 (06 May 2019 06:00) (16 - 17)    SpO2: 99% (06 May 2019 06:00) (98% - 99%) 38 F PMHx of elevated triglycerides presents to ED c/o of abdominal pain. Patient stated she awoke at 8:00 a.m., and noticed some moderate abdominal pain and took a total 400 mg advil. An hour later, she felt feverish (but did not take temp) with shaking, body aches and chills, and she ate a small amount of her sandwich but vomited 30 minutes later. Patient vomited a second time hours later, and this time noticed a cup of bright red blood. Denied recent illness, travel, eating raw meat/fish, cough, SOB, chest pain, diarrhea or constipation. Note patient unsure of LMP due to Mirena placement 3 years ago and only has irregular spotting on occasion.         The patient was admitted to r/o GI hemorrhage  with hematemesis .        with hx severe hypertriglyceridemia   , hosp course     The patient was seen by GI, Dr. Alanis. The patient was found to be FOBT negative. CT abdomen showed an incidental finding of colitis. She was started on iv  cipro/Flagyl. Stool culture was negative. Diarrhea has since resolved. The patient also complained of hematemesis prior to admission. No episodes noted on this admission. The patient was started on Pepcid, carafate, and Zofran PRN.  pt also hx     Elevated triglycerides with high cholesterol Patient states she was on atorvastatin (20 mg)     but she was nauseated from it so stopped taking since January.     -Triglycerides >5000, repeat  started  on meds lopid  trended down     ICU consult ed , possible IV insulin and plasmapheresis but said not a candidate     -Cardio consulted, will start fenofibrate 145 mg daily and fish oil 2g BID    -Patient denying current abdominal pain/nausea in hospital course      will do mrcp if need  or symptom  dylan as per gi  dr gandara gp .     pt found to be     Hyponatremia  on hypovolumic  -Na 134  - now 136 , possibly from sec to  vomiting  dehydration - resolved .    -Continue IV Fluids @ 100 cc/hr as npo midnight .  hypomagnesia ,hypocalcemia and hypokalemia replaced - stable , ca. k, mag.         : Elevated blood sugar  but   -No Hx of DM. hb a1c 5.1 .         -pt went for  endoscopy  on ppi , npo except meds , medically optimize for endoscopy .    diarrhea  with colitis   resolved  symptom -  in cipro iv , iv flagyl   switch to po abx  in am    stool cult neg .     he patient tolerated po diet. EGD was performed showing hiatal hernia and gastritis  no active gi bleed noticed  so ruled out this time ,  as per gi dr woody ids ok to be dc home no need ppi .     The patient also has a history of familial hypertriglyceridemia. The patient is  started on Lovaza, fenofibrate, gemfibrozil, and a statin need to continue meds  and fu out pt lipid profile .     Lipid panel trended downward. LFTs remained within normal limits.  The patient was cleared by cardiology dr valenzuela gp  and gi dr gandara gp ,     The patient is stable for discharge with close outpatient follow up with her primary care doctor, Dr. Morris.     physical day of dc     Vital Signs Last 24 Hrs    T(C): 36.6 (06 May 2019 06:00), Max: 36.6 (05 May 2019 12:20)    T(F): 97.9 (06 May 2019 06:00), Max: 97.9 (06 May 2019 06:00)    HR: 66 (06 May 2019 06:00) (63 - 82)    BP: 98/64 (06 May 2019 06:00) (98/64 - 117/83)    BP(mean): --    RR: 17 (06 May 2019 06:00) (16 - 17)    SpO2: 99% (06 May 2019 06:00) (98% - 99%)    physical exam 5-6-19  day of dc    Vital Signs Last 24 Hrs    T(C): 36.3 (06 May 2019 13:44), Max: 36.6 (06 May 2019 06:00)    T(F): 97.3 (06 May 2019 13:44), Max: 97.9 (06 May 2019 06:00)    HR: 70 (06 May 2019 13:44) (63 - 82)    BP: 105/80 (06 May 2019 13:44) (98/64 - 117/83)    BP(mean): --    RR: 17 (06 May 2019 13:44) (16 - 18)    SpO2: 100% (06 May 2019 13:44) (99% - 100%)      GEN no distress , HEENT nt/nc , perrla , CVS s1s2 no tachy , CHEST bl air entery  present  no wheez , no rale  ,     CNS aao/3  , no focal deficit , GI soft , bs present  , no tenderness , EXT no edema, pedal pulse present , SKIN no rash.

## 2019-05-03 NOTE — PROGRESS NOTE ADULT - PROBLEM SELECTOR PLAN 1
-Patient Hb dropped from 12.5 to 9.0, 11.1 today   -CT Abdomen/pelvis consistent with colitis, hepatic steatosis, hepatomegaly, mesenteric lymph nodes. No contrast extravasation  -Diet NPO  -Type and Screen  -Zofran q6h PRN nausea  -Pain scale: 650 tylenol mild, 1 mg IV morphine mod, 2 mg IV severe  -Patient declines fecal occult  -Continue pepcid 20 mg IV daily, carafate  -Note flu negative  -Patient unstable for EGD today given hypertriglyceridemia, F/U GI note -Patient Hb dropped from 12.5 to 9.0, 11.1 today   -CT Abdomen/pelvis consistent with colitis, hepatic steatosis, hepatomegaly, mesenteric lymph nodes. No contrast extravasation. Patient started on cipro/flagly  -Diet NPO  -Type and Screen  -Zofran q6h PRN nausea  -Pain scale: 650 tylenol mild, 1 mg IV morphine mod, 2 mg IV severe  -Patient declines fecal occult  -Continue pepcid 20 mg IV daily, carafate  -Note flu negative  -Patient unstable for EGD today given hypertriglyceridemia, F/U GI note -Patient Hb dropped from 12.5 to 9.0, 11.1 today   -CT Abdomen/pelvis consistent with colitis, hepatic steatosis, hepatomegaly, mesenteric lymph nodes. No contrast extravasation. Patient started on  iv cipro/flagly  -Diet NPO  -Type and Screen  -Zofran q6h PRN nausea  -Pain scale: 650 tylenol mild, 1 mg IV morphine mod, 2 mg IV severe  -Patient declines fecal occult  -Continue pepcid 20 mg IV daily, Carafate  endo Monday

## 2019-05-03 NOTE — DISCHARGE NOTE PROVIDER - NSDCFUADDAPPT_GEN_ALL_CORE_FT
fu your pmd dr richter with in 1 wk , need lipid  profile and lft repeat  lab with in 1 to 2wk again .

## 2019-05-03 NOTE — DISCHARGE NOTE PROVIDER - CARE PROVIDER_API CALL
Arturo,   [facility] [address]  Phone: [phone]  Fax: [phone]   )    -  Follow Up Time: 1 week    Ernst Alanis (DO)  Gastroenterology; Internal Medicine  [address] [place] [address]  Phone: [phone]  Fax: [phone]  Follow Up Time: 1 week

## 2019-05-03 NOTE — CONSULT NOTE ADULT - SUBJECTIVE AND OBJECTIVE BOX
[facility] - Adolfo Nicholson, Jorge, Chau, Phyllis, Leonora Castillo  Office Number: [phone]    Initial Consult Note    CHIEF COMPLAINT: Patient is a 38y old  Female who presents with a chief complaint of GI bleed (02 May 2019 16:35)      HPI:  38 F PMHx of elevated triglycerides presents to ED c/o of abdominal pain. Patient states she awoke at 8:00 a.m., and noticed some moderate abdominal pain and took a total 400 mg advil. An hour later, she felt feverish (but did not take temp) with shaking, body aches and chills, and she ate a small amount of her sandwich but vomited 30 minutes later. Patient vomited a second time hours later, and this time noticed a cup of bright red blood. Denies recent illness, travel, eating raw meat/fish, cough, SOB, chest pain, diarrhea or constipation. Note patient unsure of LMP due to Mirena placement 3 years ago and only has irregular spotting on occasion.     In the ED:  -VS: T: 98.9 HR: 125-> 118 BP: 92/64 -> 107/72 WBC: wnl  -BMP wnl except Na: 129  -s/p 2L NS bolus  -20 mg IV famotadine  -EKG Sinus tach @ 120. Personally reviewed: No ST or T wave changes, no S1Q3T3, no LVH  -Flu negative (02 May 2019 16:35)    Interval History: Patient seen and examined at the bedside. She has had no acute cardiac events on monitor, has been in sinus rhythm. Denies CP, Palpitaitons. Denies any cardiac history including MI, CVA, Stents, arrhythmias, CABG. No history of smoking or illicit drug use. Denies family history of CVD or sudden cardiac death. She was sick x 1 day, had + bloody vomiting and nausea, denies history of bleeding disorders or GI bleed in past.      PAST MEDICAL & SURGICAL HISTORY:  Normal vaginal delivery: 3  High triglycerides  Seasonal allergies  No significant past surgical history      SOCIAL HISTORY:  No tobacco, ethanol, or drug abuse.    FAMILY HISTORY:    No family history of acute MI or sudden cardiac death.    MEDICATIONS  (STANDING):  ciprofloxacin   IVPB 400 milliGRAM(s) IV Intermittent every 12 hours  ciprofloxacin   IVPB      famotidine Injectable 20 milliGRAM(s) IV Push two times a day  metroNIDAZOLE  IVPB      metroNIDAZOLE  IVPB 500 milliGRAM(s) IV Intermittent every 8 hours  potassium chloride    Tablet ER 40 milliEquivalent(s) Oral every 4 hours  potassium chloride  10 mEq/100 mL IVPB 10 milliEquivalent(s) IV Intermittent every 1 hour  sodium chloride 0.9%. 1000 milliLiter(s) (100 mL/Hr) IV Continuous <Continuous>  sucralfate 1 Gram(s) Oral two times a day    MEDICATIONS  (PRN):  acetaminophen   Tablet .. 650 milliGRAM(s) Oral every 6 hours PRN Temp greater or equal to 38C (100.4F)  acetaminophen   Tablet .. 650 milliGRAM(s) Oral every 6 hours PRN Mild Pain (1 - 3)  morphine  - Injectable 1 milliGRAM(s) IV Push every 6 hours PRN Moderate Pain (4 - 6)  morphine  - Injectable 2 milliGRAM(s) IV Push every 6 hours PRN Severe Pain (7 - 10)  ondansetron Injectable 4 milliGRAM(s) IV Push every 6 hours PRN Nausea and/or Vomiting      Allergies    No Known Allergies    Intolerances        REVIEW OF SYSTEMS:    CONSTITUTIONAL: No weakness, fevers or chills  EYES/ENT: No visual changes;  No vertigo or throat pain   NECK: No pain or stiffness  RESPIRATORY: No cough, wheezing, hemoptysis; No shortness of breath  CARDIOVASCULAR: No chest pain or palpitations  GASTROINTESTINAL: + abdominal pain. No nausea, vomiting, or hematemesis; No diarrhea or constipation. No melena or hematochezia.  GENITOURINARY: No dysuria, frequency or hematuria  NEUROLOGICAL: No numbness or weakness  SKIN: No itching or rash  All other review of systems is negative unless indicated above    VITAL SIGNS:   Vital Signs Last 24 Hrs  T(C): 37.3 (03 May 2019 07:29), Max: 38.3 (02 May 2019 17:21)  T(F): 99.2 (03 May 2019 07:29), Max: 100.9 (02 May 2019 17:21)  HR: 80 (03 May 2019 07:29) (80 - 125)  BP: 107/71 (03 May 2019 07:29) (92/64 - 108/74)  BP(mean): --  RR: 18 (03 May 2019 07:29) (15 - 18)  SpO2: 98% (03 May 2019 07:29) (96% - 100%)    I&O's Summary    02 May 2019 07:01  -  03 May 2019 07:00  --------------------------------------------------------  IN: 1520 mL / OUT: 0 mL / NET: 1520 mL        On Exam:    Constitutional: NAD, alert and oriented x 3  Lungs:  Non-labored, breath sounds are clear bilaterally, No wheezing, rales or rhonchi  Cardiovascular: RRR.  S1 and S2 positive.  No murmurs, rubs, gallops or clicks  Gastrointestinal: mild tenderness diffusely, Bowel Sounds present, soft, nondistended.   Lymph: No peripheral edema. No cervical lymphadenopathy.  Neurological: Alert, no focal deficits  Skin: No rashes or ulcers   Psych:  Mood & affect appropriate.    LABS: All Labs Reviewed:                        11.1   4.79  )-----------( 129      ( 03 May 2019 05:27 )             30.9                         9.0    3.71  )-----------( 139      ( 02 May 2019 14:23 )             27.6                         12.5   4.36  )-----------( 144      ( 02 May 2019 12:41 )             37.3     03 May 2019 05:27    134    |  99     |  6      ----------------------------<  154    3.1     |  21     |  0.63   02 May 2019 12:41    129    |  95     |  12     ----------------------------<  194    3.6     |  13     |  0.53     Ca    6.2        03 May 2019 05:27  Ca    see note      02 May 2019 12:41  Phos  1.8       03 May 2019 05:27  Mg     1.1       03 May 2019 05:27    TPro  6.6    /  Alb  3.5    /  TBili  1.2    /  DBili  x      /  AST  see note  /  ALT  see note  /  AlkPhos  82     02 May 2019 12:41          Blood Culture:         RADIOLOGY:    EKG: [facility] - Adolfo Nicholson, Jorge, Chau, Phyllis, Leonora Castillo  Office Number: [phone]    Initial Consult Note    CHIEF COMPLAINT: Patient is a 38y old  Female who presents with a chief complaint of GI bleed (02 May 2019 16:35)      HPI:  38 F PMHx of elevated triglycerides presents to ED c/o of abdominal pain. Patient states she awoke at 8:00 a.m., and noticed some moderate abdominal pain and took a total 400 mg advil. An hour later, she felt feverish (but did not take temp) with shaking, body aches and chills, and she ate a small amount of her sandwich but vomited 30 minutes later. Patient vomited a second time hours later, and this time noticed a cup of bright red blood. Denies recent illness, travel, eating raw meat/fish, cough, SOB, chest pain, diarrhea or constipation. Note patient unsure of LMP due to Mirena placement 3 years ago and only has irregular spotting on occasion.     In the ED:  -VS: T: 98.9 HR: 125-> 118 BP: 92/64 -> 107/72 WBC: wnl  -BMP wnl except Na: 129  -s/p 2L NS bolus  -20 mg IV famotadine  -EKG Sinus tach @ 120. Personally reviewed: No ST or T wave changes, no S1Q3T3, no LVH  -Flu negative (02 May 2019 16:35)    Interval History: Patient seen and examined at the bedside. She has had no acute cardiac events on monitor, has been in sinus rhythm. Denies CP, Palpitaitons. She was febrile overnight as well. Denies any cardiac history including MI, CVA, Stents, arrhythmias, CABG. No history of smoking or illicit drug use. Denies family history of CVD or sudden cardiac death. She was sick x 1 day, had + bloody vomiting and nausea, denies history of bleeding disorders or GI bleed in past.      PAST MEDICAL & SURGICAL HISTORY:  Normal vaginal delivery: 3  High triglycerides  Seasonal allergies  No significant past surgical history      SOCIAL HISTORY:  No tobacco, ethanol, or drug abuse.    FAMILY HISTORY:    No family history of acute MI or sudden cardiac death.    MEDICATIONS  (STANDING):  ciprofloxacin   IVPB 400 milliGRAM(s) IV Intermittent every 12 hours  ciprofloxacin   IVPB      famotidine Injectable 20 milliGRAM(s) IV Push two times a day  metroNIDAZOLE  IVPB      metroNIDAZOLE  IVPB 500 milliGRAM(s) IV Intermittent every 8 hours  potassium chloride    Tablet ER 40 milliEquivalent(s) Oral every 4 hours  potassium chloride  10 mEq/100 mL IVPB 10 milliEquivalent(s) IV Intermittent every 1 hour  sodium chloride 0.9%. 1000 milliLiter(s) (100 mL/Hr) IV Continuous <Continuous>  sucralfate 1 Gram(s) Oral two times a day    MEDICATIONS  (PRN):  acetaminophen   Tablet .. 650 milliGRAM(s) Oral every 6 hours PRN Temp greater or equal to 38C (100.4F)  acetaminophen   Tablet .. 650 milliGRAM(s) Oral every 6 hours PRN Mild Pain (1 - 3)  morphine  - Injectable 1 milliGRAM(s) IV Push every 6 hours PRN Moderate Pain (4 - 6)  morphine  - Injectable 2 milliGRAM(s) IV Push every 6 hours PRN Severe Pain (7 - 10)  ondansetron Injectable 4 milliGRAM(s) IV Push every 6 hours PRN Nausea and/or Vomiting      Allergies    No Known Allergies    Intolerances        REVIEW OF SYSTEMS:    CONSTITUTIONAL: No weakness, fevers or chills  EYES/ENT: No visual changes;  No vertigo or throat pain   NECK: No pain or stiffness  RESPIRATORY: No cough, wheezing, hemoptysis; No shortness of breath  CARDIOVASCULAR: No chest pain or palpitations  GASTROINTESTINAL: + abdominal pain. No nausea, vomiting, or hematemesis; No diarrhea or constipation. No melena or hematochezia.  GENITOURINARY: No dysuria, frequency or hematuria  NEUROLOGICAL: No numbness or weakness  SKIN: No itching or rash  All other review of systems is negative unless indicated above    VITAL SIGNS:   Vital Signs Last 24 Hrs  T(C): 37.3 (03 May 2019 07:29), Max: 38.3 (02 May 2019 17:21)  T(F): 99.2 (03 May 2019 07:29), Max: 100.9 (02 May 2019 17:21)  HR: 80 (03 May 2019 07:29) (80 - 125)  BP: 107/71 (03 May 2019 07:29) (92/64 - 108/74)  BP(mean): --  RR: 18 (03 May 2019 07:29) (15 - 18)  SpO2: 98% (03 May 2019 07:29) (96% - 100%)    I&O's Summary    02 May 2019 07:01  -  03 May 2019 07:00  --------------------------------------------------------  IN: 1520 mL / OUT: 0 mL / NET: 1520 mL        On Exam:    Constitutional: NAD, alert and oriented x 3  Lungs:  Non-labored, breath sounds are clear bilaterally, No wheezing, rales or rhonchi  Cardiovascular: RRR.  S1 and S2 positive.  No murmurs, rubs, gallops or clicks  Gastrointestinal: mild tenderness diffusely, Bowel Sounds present, soft, nondistended.   Lymph: No peripheral edema. No cervical lymphadenopathy.  Neurological: Alert, no focal deficits  Skin: No rashes or ulcers   Psych:  Mood & affect appropriate.    LABS: All Labs Reviewed:                        11.1   4.79  )-----------( 129      ( 03 May 2019 05:27 )             30.9                         9.0    3.71  )-----------( 139      ( 02 May 2019 14:23 )             27.6                         12.5   4.36  )-----------( 144      ( 02 May 2019 12:41 )             37.3     03 May 2019 05:27    134    |  99     |  6      ----------------------------<  154    3.1     |  21     |  0.63   02 May 2019 12:41    129    |  95     |  12     ----------------------------<  194    3.6     |  13     |  0.53     Ca    6.2        03 May 2019 05:27  Ca    see note      02 May 2019 12:41  Phos  1.8       03 May 2019 05:27  Mg     1.1       03 May 2019 05:27    TPro  6.6    /  Alb  3.5    /  TBili  1.2    /  DBili  x      /  AST  see note  /  ALT  see note  /  AlkPhos  82     02 May 2019 12:41          Blood Culture:         RADIOLOGY:    EKG: [facility] - Adolfo Nicholson, Jorge, Chau, Phyllis, Leonora Castillo  Office Number: [phone]    Initial Consult Note    CHIEF COMPLAINT: Patient is a 38y old  Female who presents with a chief complaint of GI bleed (02 May 2019 16:35)      HPI:  38 F PMHx of elevated triglycerides presents to ED c/o of abdominal pain. Patient states she awoke at 8:00 a.m., and noticed some moderate abdominal pain and took a total 400 mg advil. An hour later, she felt feverish (but did not take temp) with shaking, body aches and chills, and she ate a small amount of her sandwich but vomited 30 minutes later. Patient vomited a second time hours later, and this time noticed a cup of bright red blood. Denies recent illness, travel, eating raw meat/fish, cough, SOB, chest pain, diarrhea or constipation. Note patient unsure of LMP due to Mirena placement 3 years ago and only has irregular spotting on occasion.     In the ED:  -VS: T: 98.9 HR: 125-> 118 BP: 92/64 -> 107/72 WBC: wnl  -BMP wnl except Na: 129  -s/p 2L NS bolus  -20 mg IV famotadine  -EKG Sinus tach @ 120. Personally reviewed: No ST or T wave changes, no S1Q3T3, no LVH  -Flu negative (02 May 2019 16:35)    Interval History: Patient seen and examined at the bedside. She has had no acute cardiac events on monitor, has been in sinus rhythm. Denies CP, Palpitaitons. She was febrile overnight as well. Denies any cardiac history including MI, CVA, Stents, arrhythmias, CABG. No history of smoking or illicit drug use. Denies family history of CVD or sudden cardiac death. She was sick x 1 day, had + bloody vomiting and nausea, denies history of bleeding disorders or GI bleed in past.      PAST MEDICAL & SURGICAL HISTORY:  Normal vaginal delivery: 3  High triglycerides  Seasonal allergies  No significant past surgical history      SOCIAL HISTORY:  No tobacco, ethanol, or drug abuse.    FAMILY HISTORY:    No family history of acute MI or sudden cardiac death.    MEDICATIONS  (STANDING):  ciprofloxacin   IVPB 400 milliGRAM(s) IV Intermittent every 12 hours  ciprofloxacin   IVPB      famotidine Injectable 20 milliGRAM(s) IV Push two times a day  metroNIDAZOLE  IVPB      metroNIDAZOLE  IVPB 500 milliGRAM(s) IV Intermittent every 8 hours  potassium chloride    Tablet ER 40 milliEquivalent(s) Oral every 4 hours  potassium chloride  10 mEq/100 mL IVPB 10 milliEquivalent(s) IV Intermittent every 1 hour  sodium chloride 0.9%. 1000 milliLiter(s) (100 mL/Hr) IV Continuous <Continuous>  sucralfate 1 Gram(s) Oral two times a day    MEDICATIONS  (PRN):  acetaminophen   Tablet .. 650 milliGRAM(s) Oral every 6 hours PRN Temp greater or equal to 38C (100.4F)  acetaminophen   Tablet .. 650 milliGRAM(s) Oral every 6 hours PRN Mild Pain (1 - 3)  morphine  - Injectable 1 milliGRAM(s) IV Push every 6 hours PRN Moderate Pain (4 - 6)  morphine  - Injectable 2 milliGRAM(s) IV Push every 6 hours PRN Severe Pain (7 - 10)  ondansetron Injectable 4 milliGRAM(s) IV Push every 6 hours PRN Nausea and/or Vomiting      Allergies    No Known Allergies    Intolerances        REVIEW OF SYSTEMS:    CONSTITUTIONAL: No weakness, fevers or chills  EYES/ENT: No visual changes;  No vertigo or throat pain   NECK: No pain or stiffness  RESPIRATORY: No cough, wheezing, hemoptysis; No shortness of breath  CARDIOVASCULAR: No chest pain or palpitations  GASTROINTESTINAL: + abdominal pain. No nausea, vomiting, or hematemesis; No diarrhea or constipation. No melena or hematochezia.  GENITOURINARY: No dysuria, frequency or hematuria  NEUROLOGICAL: No numbness or weakness  SKIN: No itching or rash  All other review of systems is negative unless indicated above    VITAL SIGNS:   Vital Signs Last 24 Hrs  T(C): 37.3 (03 May 2019 07:29), Max: 38.3 (02 May 2019 17:21)  T(F): 99.2 (03 May 2019 07:29), Max: 100.9 (02 May 2019 17:21)  HR: 80 (03 May 2019 07:29) (80 - 125)  BP: 107/71 (03 May 2019 07:29) (92/64 - 108/74)  BP(mean): --  RR: 18 (03 May 2019 07:29) (15 - 18)  SpO2: 98% (03 May 2019 07:29) (96% - 100%)    I&O's Summary    02 May 2019 07:01  -  03 May 2019 07:00  --------------------------------------------------------  IN: 1520 mL / OUT: 0 mL / NET: 1520 mL        On Exam:    Constitutional: NAD, alert and oriented x 3  HEENT dry MM, anicteric  Lungs:  Non-labored, breath sounds are clear bilaterally, No wheezing, rales or rhonchi  Cardiovascular: RRR.  S1 and S2 positive.  No murmurs, rubs, gallops or clicks  Gastrointestinal: mild tenderness diffusely, Bowel Sounds present, soft, nondistended.   Lymph: No peripheral edema. No cervical lymphadenopathy.  Neurological: Alert, no focal deficits  Skin: No rashes or ulcers   Psych:  Mood & affect appropriate.    LABS: All Labs Reviewed:                        11.1   4.79  )-----------( 129      ( 03 May 2019 05:27 )             30.9                         9.0    3.71  )-----------( 139      ( 02 May 2019 14:23 )             27.6                         12.5   4.36  )-----------( 144      ( 02 May 2019 12:41 )             37.3     03 May 2019 05:27    134    |  99     |  6      ----------------------------<  154    3.1     |  21     |  0.63   02 May 2019 12:41    129    |  95     |  12     ----------------------------<  194    3.6     |  13     |  0.53     Ca    6.2        03 May 2019 05:27  Ca    see note      02 May 2019 12:41  Phos  1.8       03 May 2019 05:27  Mg     1.1       03 May 2019 05:27    TPro  6.6    /  Alb  3.5    /  TBili  1.2    /  DBili  x      /  AST  see note  /  ALT  see note  /  AlkPhos  82     02 May 2019 12:41          Blood Culture:         RADIOLOGY:    EKG:

## 2019-05-03 NOTE — CONSULT NOTE ADULT - ATTENDING COMMENTS
Hypertriglyceridemia without associated pancreatitis. Initially presented with nausea and vomiting, which have currently resolved. CT A/P with oral and iV contrast with no evidence of pancreatitis. Abdomen soft and nontender. No fevers or chills. Amylase and lipase within normal limits. Would hold off on insulin gtt at present, especially given that patient with a long-standing history of uncontrolled hypertriglyeridemia. Would continue with medical management at this time. Please call back with any questions
ct reviewed  doubt colitis   hold off on antibiotics  advance to clears  npo p mn   upper gastrointestinal endoscopy in am  cont ppi
I saw and examined the patient personally. Spoke with above provider regarding this case. I reviewed the above findings completely.  I agree with the above history, physical, and plan which I have edited where appropriate.   ICU called to eval given metabolic derrangements and high trigs  Patient at high risk for decompensation given the above comorbidities and active medical issues.   I have personally spent >35 minutes  of critical care time in examining patient, reviewing chart, discussing care/management with patient/family, and  ICU team.

## 2019-05-03 NOTE — PROGRESS NOTE ADULT - SUBJECTIVE AND OBJECTIVE BOX
Patient is a 38y old  Female who presents with a chief complaint of GI bleed (03 May 2019 09:47)      HPI:  38 F PMHx of elevated triglycerides presents to ED c/o of abdominal pain. Patient states she awoke at 8:00 a.m., and noticed some moderate abdominal pain and took a total 400 mg advil. An hour later, she felt feverish (but did not take temp) with shaking, body aches and chills, and she ate a small amount of her sandwich but vomited 30 minutes later. Patient vomited a second time hours later, and this time noticed a cup of bright red blood. Denies recent illness, travel, eating raw meat/fish, cough, SOB, chest pain, diarrhea or constipation. Note patient unsure of LMP due to Mirena placement 3 years ago and only has irregular spotting on occasion.     In the ED:  -VS: T: 98.9 HR: 125-> 118 BP: 92/64 -> 107/72 WBC: wnl  -BMP wnl except Na: 129  -s/p 2L NS bolus  -20 mg IV famotadine  -EKG Sinus tach @ 120. Personally reviewed: No ST or T wave changes, no S1Q3T3, no LVH  -Flu negative (02 May 2019 16:35)      INTERVAL HPI/OVERNIGHT EVENTS: No acute events overnight. Admits diarrhea. Patient denies N/V/C denies abdominal pain. No hemoptysis.   T(C): 37.1 (05-03-19 @ 11:51), Max: 38.3 (05-02-19 @ 17:21)  HR: 73 (05-03-19 @ 11:51) (73 - 118)  BP: 114/79 (05-03-19 @ 11:51) (102/66 - 114/79)  RR: 18 (05-03-19 @ 11:51) (15 - 18)  SpO2: 100% (05-03-19 @ 11:51) (96% - 100%)  Wt(kg): --  I&O's Summary    02 May 2019 07:01  -  03 May 2019 07:00  --------------------------------------------------------  IN: 1520 mL / OUT: 0 mL / NET: 1520 mL        REVIEW OF SYSTEMS:  CONSTITUTIONAL: No fever, weight loss, or fatigue  EYES: No eye pain, visual disturbances, or discharge,   RESPIRATORY: No cough, wheezing, chills or hemoptysis; No shortness of breath  CARDIOVASCULAR: No chest pain, palpitations, dizziness, or leg swelling  GASTROINTESTINAL: No abdominal or epigastric pain. No nausea, vomiting, or hematemesis admits diarrhea  GENITOURINARY: No dysuria, frequency, hematuria, or incontinence  NEUROLOGICAL: No headaches, memory loss, loss of strength, admits to lower extremity peripheral neuropathy  SKIN: No itching, burning  LYMPH NODES: No enlarged glands  MUSCULOSKELETAL: No joint pain or swelling; No muscle, back, or extremity pain  PSYCHIATRIC: No depression, anxiety, mood swings, or difficulty sleeping    PHYSICAL EXAM:  GENERAL: NAD, well-groomed, well-developed  HEAD:  Atraumatic, Normocephalic  EYES: EOMI, PERRLA, conjunctiva and sclera clear  ENMT: No tonsillar erythema, exudates, or enlargement; Moist mucous membranes  NECK: Supple, No JVD,  NERVOUS SYSTEM:  Alert & Oriented X3, Good concentration; Motor Strength 5/5 B/L upper and lower extremities  CHEST/LUNG: Clear to percussion bilaterally; No rales, rhonchi, wheezing, or rubs  HEART: Regular rate and rhythm; No murmurs, rubs, or gallops  ABDOMEN: Soft, diffusely tender, normoactive bowel sounds  EXTREMITIES:  2+ Peripheral Pulses, No clubbing, cyanosis, or edema  LYMPH: No lymphadenopathy noted  SKIN: Warm, dry, upper back tattoo, umbilicus jewelry    MEDICATIONS  (STANDING):  ciprofloxacin   IVPB 400 milliGRAM(s) IV Intermittent every 12 hours  ciprofloxacin   IVPB      famotidine Injectable 20 milliGRAM(s) IV Push two times a day  fenofibrate Tablet 48 milliGRAM(s) Oral daily  fenofibrate Tablet 145 milliGRAM(s) Oral daily  metroNIDAZOLE  IVPB      metroNIDAZOLE  IVPB 500 milliGRAM(s) IV Intermittent every 8 hours  omega-3-Acid Ethyl Esters 2 Gram(s) Oral two times a day  potassium chloride    Tablet ER 40 milliEquivalent(s) Oral every 4 hours  potassium chloride  10 mEq/100 mL IVPB 10 milliEquivalent(s) IV Intermittent every 1 hour  sodium chloride 0.9%. 1000 milliLiter(s) (100 mL/Hr) IV Continuous <Continuous>  sucralfate 1 Gram(s) Oral two times a day    MEDICATIONS  (PRN):  acetaminophen   Tablet .. 650 milliGRAM(s) Oral every 6 hours PRN Temp greater or equal to 38C (100.4F)  acetaminophen   Tablet .. 650 milliGRAM(s) Oral every 6 hours PRN Mild Pain (1 - 3)  morphine  - Injectable 1 milliGRAM(s) IV Push every 6 hours PRN Moderate Pain (4 - 6)  morphine  - Injectable 2 milliGRAM(s) IV Push every 6 hours PRN Severe Pain (7 - 10)  ondansetron Injectable 4 milliGRAM(s) IV Push every 6 hours PRN Nausea and/or Vomiting      LABS:                        11.1   4.79  )-----------( 129      ( 03 May 2019 05:27 )             30.9     05-03    133<L>  |  100  |  4<L>  ----------------------------<  148<H>  3.0<L>   |  20<L>  |  0.62    Ca    7.2<L>      03 May 2019 09:22  Phos  1.8     05-03  Mg     1.1     05-03    TPro  6.6  /  Alb  3.5  /  TBili  1.2  /  DBili  x   /  AST  see note  /  ALT  see note  /  AlkPhos  82  05-02        CAPILLARY BLOOD GLUCOSE                  RADIOLOGY & ADDITIONAL TESTS:    Imaging Personally Reviewed:       Advance Directives:      Palliative Care: Patient is a 38y old  Female who presents with a chief complaint of GI bleed (03 May 2019 09:47)      HPI:  38 F PMHx of elevated triglycerides presents to ED c/o of abdominal pain. Patient states she awoke at 8:00 a.m., and noticed some moderate abdominal pain and took a total 400 mg advil. An hour later, she felt feverish (but did not take temp) with shaking, body aches and chills, and she ate a small amount of her sandwich but vomited 30 minutes later. Patient vomited a second time hours later, and this time noticed a cup of bright red blood. Denies recent illness, travel, eating raw meat/fish, cough, SOB, chest pain, diarrhea or constipation. Note patient unsure of LMP due to Mirena placement 3 years ago and only has irregular spotting on occasion.      admitted for GI bleed,  hx severe hypertriglyceridemia.     INTERVAL HPI/OVERNIGHT EVENTS: No acute events overnight. Admits diarrhea after  bowel prep .   Patient denies N/V/C denies abdominal pain. No hemoptysis  , no diarrhea .   T(C): 37.1 (05-03-19 @ 11:51), Max: 38.3 (05-02-19 @ 17:21)  HR: 73 (05-03-19 @ 11:51) (73 - 118)  BP: 114/79 (05-03-19 @ 11:51) (102/66 - 114/79)  RR: 18 (05-03-19 @ 11:51) (15 - 18)  SpO2: 100% (05-03-19 @ 11:51) (96% - 100%)  Wt(kg): --  I&O's Summary    02 May 2019 07:01  -  03 May 2019 07:00  --------------------------------------------------------  IN: 1520 mL / OUT: 0 mL / NET: 1520 mL        REVIEW OF SYSTEMS:  CONSTITUTIONAL: No fever, weight loss, or fatigue  EYES: No eye pain, visual disturbances, or discharge,   RESPIRATORY: No cough, wheezing, chills No shortness of breath  CARDIOVASCULAR: No chest pain, palpitations, dizziness, or leg swelling  GASTROINTESTINAL: No abdominal or epigastric pain. No nausea, vomiting,   +diarrhea  GENITOURINARY: No dysuria, frequency, hematuria, or incontinence  NEUROLOGICAL: No headaches, memory loss, loss of strength, admits to lower extremity peripheral neuropathy  SKIN: No itching, burning  MUSCULOSKELETAL: No joint pain or swelling; No muscle, back, or extremity pain  PSYCHIATRIC: No depression, anxiety, mood swings, or difficulty sleeping    PHYSICAL EXAM:  GENERAL: NAD, well-groomed, well-developed  HEAD:  Atraumatic, Normocephalic  EYES: EOMI, PERRLA, conjunctiva and sclera clear  ENMT: Moist mucous membranes  NECK: Supple, No JVD,  NERVOUS SYSTEM:  Alert & Oriented X3, Good concentration;   Motor Strength 5/5 B/L upper and lower extremities  CHEST/LUNG: Clear to percussion bilaterally; No rales, rhonchi, wheezing,  HEART: Regular rate and rhythm; No murmurs,   ABDOMEN: Soft, diffusely tender, normoactive bowel sounds  EXTREMITIES:  2+ Peripheral Pulses, No clubbing, cyanosis, or edema    SKIN: Warm, dry, upper back tattoo, umbilicus jewelry    MEDICATIONS  (STANDING):  ciprofloxacin   IVPB 400 milliGRAM(s) IV Intermittent every 12 hours  ciprofloxacin   IVPB      famotidine Injectable 20 milliGRAM(s) IV Push two times a day  fenofibrate Tablet 48 milliGRAM(s) Oral daily  fenofibrate Tablet 145 milliGRAM(s) Oral daily  metroNIDAZOLE  IVPB      metroNIDAZOLE  IVPB 500 milliGRAM(s) IV Intermittent every 8 hours  omega-3-Acid Ethyl Esters 2 Gram(s) Oral two times a day  potassium chloride    Tablet ER 40 milliEquivalent(s) Oral every 4 hours  potassium chloride  10 mEq/100 mL IVPB 10 milliEquivalent(s) IV Intermittent every 1 hour  sodium chloride 0.9%. 1000 milliLiter(s) (100 mL/Hr) IV Continuous <Continuous>  sucralfate 1 Gram(s) Oral two times a day    MEDICATIONS  (PRN):  acetaminophen   Tablet .. 650 milliGRAM(s) Oral every 6 hours PRN Temp greater or equal to 38C (100.4F)  acetaminophen   Tablet .. 650 milliGRAM(s) Oral every 6 hours PRN Mild Pain (1 - 3)  morphine  - Injectable 1 milliGRAM(s) IV Push every 6 hours PRN Moderate Pain (4 - 6)  morphine  - Injectable 2 milliGRAM(s) IV Push every 6 hours PRN Severe Pain (7 - 10)  ondansetron Injectable 4 milliGRAM(s) IV Push every 6 hours PRN Nausea and/or Vomiting      LABS:                        11.1   4.79  )-----------( 129      ( 03 May 2019 05:27 )             30.9     05-03    133<L>  |  100  |  4<L>  ----------------------------<  148<H>  3.0<L>   |  20<L>  |  0.62    Ca    7.2<L>      03 May 2019 09:22  Phos  1.8     05-03  Mg     1.1     05-03    TPro  6.6  /  Alb  3.5  /  TBili  1.2  /  DBili  x   /  AST  see note  /  ALT  see note  /  AlkPhos  82  05-02                        RADIOLOGY & ADDITIONAL TESTS:    Imaging Personally Reviewed:   no new test    Advance Directives:  full code

## 2019-05-03 NOTE — PROGRESS NOTE ADULT - ASSESSMENT
38 F PMHx of elevated triglycerides presents to ED c/o of abdominal pain admitted for GI bleed, found to have severe hypertriglyceridemia. 38 F PMHx of elevated triglycerides presents to ED c/o of abdominal pain admitted for GI bleed,  hx of  severe hypertriglyceridemia.

## 2019-05-03 NOTE — CONSULT NOTE ADULT - ASSESSMENT
38 F PMHx of elevated triglycerides presents to ED c/o of abdominal pain admitted for GI bleed.     - No clear evidence of acute ischemia  - No evidence of volume overload  - No acute changes on EKG, was sinus tachycardia   - Previous ECHO unknown  - BP well controlled, monitor routine hemodynamics  - Pt with Hypokalemia, Hypomagnesemia, Hypophosphatemia, and hypocalcemia. Monitor and replete lytes, keep K>4, Mg>2  - Repeat BMP pre-EGD  - Patient is low -moderate risk (given elevated TG and electrolyte abnormalities) for low risk procedure today.  - Other cardiovascular workup will depend on clinical course.  - All other workup per primary team  - Will follow     CHARTING IN PROGRESS 38 F PMHx of elevated triglycerides presents to ED c/o of abdominal pain admitted for GI bleed.     - No clear evidence of acute ischemia  - No evidence of volume overload  - No acute changes on EKG, was sinus tachycardia   - Previous ECHO unknown  - BP well controlled, monitor routine hemodynamics  - Pt with Hypokalemia, Hypomagnesemia, Hypophosphatemia, and hypocalcemia. Monitor and replete lytes, keep K>4, Mg>2  - Repeat BMP pre-EGD  - Patient is NOT medically optimized for low risk procedure today given severely elevated TG and electrolyte abnormalities) today.  - Unclear cause for severe high TG, hyponatremia, low K, low Mg, low Phos, low Calcium  - Amylase and Lipase NML  - Rec to check utox and blood etoh level  - Other cardiovascular workup will depend on clinical course.  - All other workup per primary team  - Will follow 38 F PMHx of elevated triglycerides presents to ED c/o of abdominal pain admitted for GI bleed.     - No clear evidence of acute ischemia  - No evidence of volume overload  - No acute changes on EKG, was sinus tachycardia   - Previous ECHO unknown. Check ECHO  - BP well controlled, monitor routine hemodynamics  - Pt with Hypokalemia, Hypomagnesemia, Hypophosphatemia, and hypocalcemia, concerning in the setting of Hypertriglyceridemia. Monitor and replete lytes, keep K>4, Mg>2  - Patient is NOT medically optimized for low risk procedure today given severely elevated TG and electrolyte abnormalities) today.  - Start Fenofibrate 135 mg PO Daily and fishoil 2 grams BID  - Currently no signs of active pancreatitis given normal amylase and lipase, but would monitor closely  - Rec to check utox and blood etoh level  - Therefore will call ICU for evaluation.  - Other cardiovascular workup will depend on clinical course.  - All other workup per primary team  - Will follow 38 F PMHx of elevated triglycerides presents to ED c/o of abdominal pain admitted for GI bleed.     - No clear evidence of acute ischemia  - No evidence of volume overload  - No acute changes on EKG, was sinus tachycardia   - Previous ECHO unknown. Check ECHO  - BP well controlled, monitor routine hemodynamics  - Pt with Hypokalemia, Hypomagnesemia, Hypophosphatemia, and hypocalcemia, concerning in the setting of Hypertriglyceridemia. Monitor and replete lytes, keep K>4, Mg>2  - Patient is NOT medically optimized for low risk procedure today given severely elevated TG and electrolyte abnormalities) today.  - Start Fenofibrate 145 mg PO Daily and fishoil 2 grams BID  - Currently no signs of active pancreatitis given normal amylase and lipase, but would monitor closely  - Rec to check utox and blood etoh level  - Therefore will call ICU for evaluation.  - Other cardiovascular workup will depend on clinical course.  - All other workup per primary team  - Will follow 38 F PMHx of elevated triglycerides presents to ED c/o of abdominal pain admitted for GI bleed.     - No clear evidence of acute ischemia  - No evidence of volume overload  - No acute changes on EKG, was sinus tachycardia   - Tachy was likely reactive to dehydration    - Previous ECHO unknown. Check ECHO     - Pt with Hypokalemia, Hypomagnesemia, Hypophosphatemia, and hypocalcemia, concerning in the setting of Hypertriglyceridemia.    - Patient is NOT medically optimized for low risk procedure today given severely elevated TG and electrolyte abnormalities) today.  - Start Fenofibrate 145 mg PO Daily and fish oil 2 grams BID  - Serial lipid profiles.   - Currently no signs of active pancreatitis given normal amylase and lipase, but I feel that she is more symptomatic for her hypertriglyerides.  - Given metabolic derrangements would get ICU eval. Consider Insulin gtt if derrangements not improving or pancreatitis develops.   - Rec to check utox and blood etoh level     - Other cardiovascular workup will depend on clinical course.  - All other workup per primary team  - Will follow

## 2019-05-03 NOTE — PROGRESS NOTE ADULT - SUBJECTIVE AND OBJECTIVE BOX
INTERVAL HPI/OVERNIGHT EVENTS:  pt seen and examined earlier this am   denies further n/v  c/o  generalized abd pain and non bloody diarrhea after receiving contrast  tolerating some water  febrile overnight labs noted  not cleared for egd    MEDICATIONS  (STANDING):  ciprofloxacin   IVPB 400 milliGRAM(s) IV Intermittent every 12 hours  ciprofloxacin   IVPB      famotidine Injectable 20 milliGRAM(s) IV Push two times a day  fenofibrate Tablet 48 milliGRAM(s) Oral daily  metroNIDAZOLE  IVPB      metroNIDAZOLE  IVPB 500 milliGRAM(s) IV Intermittent every 8 hours  potassium chloride    Tablet ER 40 milliEquivalent(s) Oral every 4 hours  potassium chloride  10 mEq/100 mL IVPB 10 milliEquivalent(s) IV Intermittent every 1 hour  sodium chloride 0.9%. 1000 milliLiter(s) (100 mL/Hr) IV Continuous <Continuous>  sucralfate 1 Gram(s) Oral two times a day    MEDICATIONS  (PRN):  acetaminophen   Tablet .. 650 milliGRAM(s) Oral every 6 hours PRN Temp greater or equal to 38C (100.4F)  acetaminophen   Tablet .. 650 milliGRAM(s) Oral every 6 hours PRN Mild Pain (1 - 3)  morphine  - Injectable 1 milliGRAM(s) IV Push every 6 hours PRN Moderate Pain (4 - 6)  morphine  - Injectable 2 milliGRAM(s) IV Push every 6 hours PRN Severe Pain (7 - 10)  ondansetron Injectable 4 milliGRAM(s) IV Push every 6 hours PRN Nausea and/or Vomiting      Allergies    No Known Allergies    Intolerances        Review of Systems:    General:  No wt loss, fevers, chills, night sweats, fatigue   Eyes:  Good vision, no reported pain  ENT:  No sore throat, pain, runny nose, dysphagia  CV:  No pain, palpitations, hypo/hypertension  Resp:  No dyspnea, cough, tachypnea, wheezing  GI:  +pain, No nausea, No vomiting, +diarrhea, No constipation, No weight loss, No fever, No pruritis, No rectal bleeding, No melena, No dysphagia  :  No pain, bleeding, incontinence, nocturia  Muscle:  No pain, weakness  Neuro:  No weakness, tingling, memory problems  Psych:  No fatigue, insomnia, mood problems, depression  Endocrine:  No polyuria, polydypsia, cold/heat intolerance  Heme:  No petechiae, ecchymosis, easy bruisability  Skin:  No rash, tattoos, scars, edema      Vital Signs Last 24 Hrs  T(C): 37.1 (03 May 2019 11:51), Max: 38.3 (02 May 2019 17:21)  T(F): 98.7 (03 May 2019 11:51), Max: 100.9 (02 May 2019 17:21)  HR: 73 (03 May 2019 11:51) (73 - 118)  BP: 114/79 (03 May 2019 11:51) (102/66 - 114/79)  BP(mean): --  RR: 18 (03 May 2019 11:51) (15 - 18)  SpO2: 100% (03 May 2019 11:51) (96% - 100%)    PHYSICAL EXAM:  General:  lying in bed  HEENT:  NC/AT  Chest:  dec bs  Cardiovascular:  rrr s1s2  Abdomen:  Soft, epigastric ttp mild dt  Extremities:  no edema  Skin:  No rash  Neuro/Psych:  A&O x3      LABS:                        11.1   4.79  )-----------( 129      ( 03 May 2019 05:27 )             30.9     05-03    133<L>  |  100  |  4<L>  ----------------------------<  148<H>  3.0<L>   |  20<L>  |  0.62    Ca    7.2<L>      03 May 2019 09:22  Phos  1.8     05-03  Mg     1.1     05-03    TPro  6.6  /  Alb  3.5  /  TBili  1.2  /  DBili  x   /  AST  see note  /  ALT  see note  /  AlkPhos  82  05-02          RADIOLOGY & ADDITIONAL TESTS:

## 2019-05-03 NOTE — CONSULT NOTE ADULT - ASSESSMENT
39 y/o female with PMH of hypertriglyceridemia, HLD, presents to [facility] with 1 day history of nausea, vomiting, and chills. Found to have hypertriglyceridemia and possible colitis, ? gastroenteritis vs. PUD.       Problem List:   1) Hypertriglyceridemia  2) Colitis   3) hypokalemia/hypomagnesemia/hypophosphatemia 37 y/o female with PMH of hypertriglyceridemia, HLD, presents to [facility] with 1 day history of nausea, vomiting, and chills. Found to have hypertriglyceridemia and possible colitis, ? gastroenteritis vs. PUD. At this time patient does not have pancreatitis so will not transfer to ICU for insulin gtt to treat hypertriglyceridemia. Please reconsult if patient's status changes.       Problem List:   1) Hypertriglyceridemia  2) Colitis   3) hypokalemia/hypomagnesemia/hypophosphatemia 37 y/o female with PMH of hypertriglyceridemia, HLD, presents to [facility] with 1 day history of nausea, vomiting, and chills. Found to have hypertriglyceridemia and possible colitis, ? gastroenteritis vs. PUD. At this time patient does not have pancreatitis so will not transfer to ICU for insulin gtt to treat hypertriglyceridemia. Please reconsult if patient's status changes.     Plan discussed with ICU attending Dr. Gann    Problem List:   1) Hypertriglyceridemia  2) Colitis   3) hypokalemia/hypomagnesemia/hypophosphatemia     1)Hypertriglyceridemia   - No indication for insulin gtt at this time  - No need for ICU at this time   - Recommending starting fish oil, fenofibrate, and high dose statin  - check TSH, hemoglobin A1c, UA (r/o nephrotic syndrome)   - Diagnosed at least 2 years ago by her primary care doctor  - If developed increased abdominal pain, may need repeat imaging, lipase and insulin gtt at that time if developing pancreatitis  - Importance of taking her medication was expressed to her and the risks of having high triglycerides    2) Colitis  -Cipro/flagyl     3)Electrolyte abnormalities  - Replace and recheck

## 2019-05-03 NOTE — PROGRESS NOTE ADULT - PROBLEM SELECTOR PLAN 2
-Patient states she was on atorvastatin (20 mg) but she was nauseated from it so stopped  -Triglycerides >5000, repeat 4,000  -STAT ICU consult, possible IV insulin and plasmapheresis  -Cardio consulted, will start fenofibrate 145 mg daily and fish oil 2g BID  -Patient denying current abdominal pain/nausea -Patient states she was on atorvastatin (20 mg) but she was nauseated from it so stopped  -Triglycerides >5000, repeat 4,000  -STAT ICU consult, possible IV insulin and plasmapheresis but said not a candidate   -Cardio consulted, will start fenofibrate 145 mg daily and fish oil 2g BID  -Patient denying current abdominal pain/nausea ,   will do mrcp if need as per gi

## 2019-05-03 NOTE — CONSULT NOTE ADULT - SUBJECTIVE AND OBJECTIVE BOX
Patient is a 38y old  Female who presents with a chief complaint of GI bleed (03 May 2019 12:10)      HPI: 37 y/o female with PMH of hypertriglyceridemia, HLD, presents to [facility] with 1 day history of nausea, vomiting, and chills. She states yesterday morning at work she felt nauseous with shaking chills, felt cold and proceeded to vomit x1, solid food from breakfast. She then took some advil and went about her day. An hour or so later she felt nauseous again with shakes and then vomited again, food particles with some blood mixed in. After this she went to the hospital. In the ED she was found to have a low grade temp of 100.8, got 2L of fluid. She did not have another vomiting episode and states she never had abdominal pain.     ICU consult called as patient found to have triglycerides > 4K and the possible need for an insulin infusion. Patient seen and examined at the bedside. She is a young, healthy appearing female. She appears in no acute distress, found walking around her hospital room. She reports no abdominal pain, no vomiting and no nausea today. She confirms the HPI above. No SOB, no CP. Some loose stool, no constipation. She states about 2 years ago she had a blood test in her primary care office that showed triglycerides > 4K, she was put on simvastatin but stopped taking it 4 months afterwards. No previous history of N/V, abdominal pain, this is her first time with these symptoms.     No allergies    Non-smoker, occasional alcohol use, no drug use    grandmother with history of diabetes     PAST MEDICAL & SURGICAL HISTORY:  Normal vaginal delivery: 3  High triglycerides  Seasonal allergies  No significant past surgical history      Review of Systems:  CONSTITUTIONAL: No fever, chills, or fatigue  EYES: No eye pain, visual disturbances, or discharge  ENMT:  No difficulty hearing, tinnitus, vertigo; No sinus or throat pain  NECK: No pain or stiffness  RESPIRATORY: No cough, wheezing, chills or hemoptysis; No shortness of breath  CARDIOVASCULAR: No chest pain, palpitations, dizziness, or leg swelling  GASTROINTESTINAL: No abdominal or epigastric pain. No nausea, vomiting, or hematemesis; No diarrhea or constipation. No melena or hematochezia.+ loose stool   GENITOURINARY: No dysuria, frequency, hematuria, or incontinence  NEUROLOGICAL: No headaches, memory loss, loss of strength, numbness, or tremors  SKIN: No itching, burning, rashes, or lesions   MUSCULOSKELETAL: No joint pain or swelling; No muscle, back, or extremity pain  PSYCHIATRIC: No depression, anxiety, mood swings, or difficulty sleeping      Medications:  ciprofloxacin   IVPB 400 milliGRAM(s) IV Intermittent every 12 hours  ciprofloxacin   IVPB      metroNIDAZOLE  IVPB      metroNIDAZOLE  IVPB 500 milliGRAM(s) IV Intermittent every 8 hours  acetaminophen   Tablet .. 650 milliGRAM(s) Oral every 6 hours PRN  acetaminophen   Tablet .. 650 milliGRAM(s) Oral every 6 hours PRN  morphine  - Injectable 1 milliGRAM(s) IV Push every 6 hours PRN  morphine  - Injectable 2 milliGRAM(s) IV Push every 6 hours PRN  ondansetron Injectable 4 milliGRAM(s) IV Push every 6 hours PRN  famotidine Injectable 20 milliGRAM(s) IV Push two times a day  sucralfate 1 Gram(s) Oral two times a day  fenofibrate Tablet 145 milliGRAM(s) Oral daily  potassium chloride    Tablet ER 40 milliEquivalent(s) Oral every 4 hours  sodium chloride 0.9%. 1000 milliLiter(s) IV Continuous <Continuous>  lactobacillus acidophilus 1 Tablet(s) Oral three times a day  omega-3-Acid Ethyl Esters 2 Gram(s) Oral two times a day      ICU Vital Signs  T(C): 37.1   T(F): 98.7   HR: 73   BP: 114/79   RR: 18   SpO2: 100%         I&O's Detail    02 May 2019 07:01  -  03 May 2019 07:00  --------------------------------------------------------  IN:    Oral Fluid: 120 mL    sodium chloride 0.9%.: 800 mL    Solution: 200 mL    Solution: 400 mL  Total IN: 1520 mL    OUT:  Total OUT: 0 mL    Total NET: 1520 mL      LABS:                        11.1   4.79  )-----------( 129      ( 03 May 2019 05:27 )             30.9     05-03    133<L>  |  100  |  4<L>  ----------------------------<  148<H>  3.0<L>   |  20<L>  |  0.62    Ca    7.2<L>      03 May 2019 09:22  Phos  1.8     05-03  Mg     1.1     05-03    TPro  6.6  /  Alb  3.5  /  TBili  1.2  /  DBili  x   /  AST  see note  /  ALT  see note  /  AlkPhos  82  05-02      CAPILLARY BLOOD GLUCOSE      CULTURES:      Physical Examination:    General: No acute distress.  Alert, oriented, interactive, nonfocal    HEENT: Pupils equal, reactive to light.  Symmetric.    PULM: Clear to auscultation bilaterally, no significant sputum production    CVS: Regular rate and rhythm, no murmurs, rubs, or gallops    ABD: Soft, nondistended, nontender, normoactive bowel sounds, no masses    EXT: No edema, nontender    SKIN: Warm and well perfused, no rashes noted.    NEURO: A&Ox3, strength 5/5 all extremities, cranial nerves grossly intact, no focal deficits      RADIOLOGY: EXAM:  CT ABDOMEN AND PELVIS OC IC                            PROCEDURE DATE:  05/02/2019          INTERPRETATION:  CLINICAL HISTORY: 38 years  Female with mid abd pain.   Spitting up blood.    COMPARISON: None.    PROCEDURE:   CT of the Abdomen and Pelvis was performed with intravenous contrast.   Intravenous contrast: 100 ml Omnipaque 350. 0 ml discarded.  Oral contrast: positive contrast was administered.  Sagittal and coronal reformats were performed.    FINDINGS:    LOWER CHEST: Within normal limits.    LIVER: The liver is low in attenuation secondary to hepatic steatosis.   There is no focal mass or intrahepatic biliary duct distention. The right   lobe is 20.5 cm sagittal.  BILE DUCTS: Normal caliber.  GALLBLADDER: Within normal limits.  SPLEEN: Within normal limits.  PANCREAS: Within normal limits.  ADRENALS: Within normal limits.  KIDNEYS/URETERS: Within normal limits.    BLADDER: Within normal limits.  REPRODUCTIVE ORGANS: IUD in the uterus.    BOWEL: No bowel obstruction. Mildly thickened ascending colon. The   appendix is normal.The stomach is decompressed and cannot be assessed.  PERITONEUM: No ascites. Small mesenteric lymph nodes measuring up to 5 mm   in short axis, not enlarged by CT criteria.  VESSELS:  Within normal limits.  RETROPERITONEUM: No lymphadenopathy.    ABDOMINAL WALL: Minimal fat-containing umbilical hernia.  BONES: Within normal limits.    IMPRESSION:     The stomach is decompressed and cannot be assessed. If there is   hematemesis, upper endoscopy is advised.    Small mesenteric lymph nodes.     Hepatic steatosis and hepatomegaly.    Mildly thickened ascending colon suggestive of colitis.    IUD in the uterus.        NICK MOYA M.D., ATTENDING RADIOLOGIST  This document has been electronically signed. May  2 2019  5:12PM          TIME SPENT: 35 min   Evaluating/treating patient, reviewing data/labs/imaging, discussing case with multidisciplinary team, discussing plan/goals of care with patient/family. Non-inclusive of procedure time.

## 2019-05-03 NOTE — PROGRESS NOTE ADULT - PROBLEM SELECTOR PLAN 1
Pt needs a 7 day fioricet RX sent to a local pharm. [facility] not [facility] and she will not have enough to last her until she can get it from them.     ct showed hepatic steatosis and hepatomegaly, mildly thickened ascending colon suggestive of colitis; no scan evidence of pancreatitis, lipase wnl  tg >5000, lipid panel noted, cards following  f/u blood cxs  monitor lytes, replete prn  endo consult, ?insulin gtt  ivf/dm low fat clears as tolerated  cont pepcid, carafate, anti emetics prn  pain control  monitor exam ct showed hepatic steatosis and hepatomegaly, mildly thickened ascending colon suggestive of colitis; no scan evidence of pancreatitis, lipase wnl  tg >5000, lipid panel noted, cards following  f/u blood cxs  monitor lytes, replete prn  endo consult, may need insulin gtt  ivf/dm low fat clears as tolerated  cont pepcid, carafate, anti emetics prn  pain control  monitor exam

## 2019-05-03 NOTE — DISCHARGE NOTE PROVIDER - PROVIDER TOKENS
FREE:[LAST:[Arturo],PHONE:[phone]],FAX:[(   )    -],ADDRESS:[[address] [place] [address]],FOLLOWUP:[1 week]],PROVIDER:[TOKEN:[[id number]],FOLLOWUP:[1 week]]

## 2019-05-04 LAB
ANION GAP SERPL CALC-SCNC: 10 MMOL/L — SIGNIFICANT CHANGE UP (ref 5–17)
APTT BLD: 30.3 SEC — SIGNIFICANT CHANGE UP (ref 28.5–37)
BUN SERPL-MCNC: 4 MG/DL — LOW (ref 7–23)
C DIFF BY PCR RESULT: SIGNIFICANT CHANGE UP
C DIFF TOX GENS STL QL NAA+PROBE: SIGNIFICANT CHANGE UP
CALCIUM SERPL-MCNC: 8.3 MG/DL — LOW (ref 8.5–10.1)
CHLORIDE SERPL-SCNC: 101 MMOL/L — SIGNIFICANT CHANGE UP (ref 96–108)
CO2 SERPL-SCNC: 24 MMOL/L — SIGNIFICANT CHANGE UP (ref 22–31)
CREAT SERPL-MCNC: 0.54 MG/DL — SIGNIFICANT CHANGE UP (ref 0.5–1.3)
CULTURE RESULTS: SIGNIFICANT CHANGE UP
GLUCOSE SERPL-MCNC: 140 MG/DL — HIGH (ref 70–99)
HCT VFR BLD CALC: 34.7 % — SIGNIFICANT CHANGE UP (ref 34.5–45)
HGB BLD-MCNC: 11.6 G/DL — SIGNIFICANT CHANGE UP (ref 11.5–15.5)
INR BLD: 0.91 RATIO — SIGNIFICANT CHANGE UP (ref 0.88–1.16)
MCHC RBC-ENTMCNC: 32.8 PG — SIGNIFICANT CHANGE UP (ref 27–34)
MCHC RBC-ENTMCNC: 33.4 GM/DL — SIGNIFICANT CHANGE UP (ref 32–36)
MCV RBC AUTO: 98 FL — SIGNIFICANT CHANGE UP (ref 80–100)
NRBC # BLD: 0 /100 WBCS — SIGNIFICANT CHANGE UP (ref 0–0)
OB PNL STL: NEGATIVE — SIGNIFICANT CHANGE UP
PLATELET # BLD AUTO: 162 K/UL — SIGNIFICANT CHANGE UP (ref 150–400)
POTASSIUM SERPL-MCNC: 3.5 MMOL/L — SIGNIFICANT CHANGE UP (ref 3.5–5.3)
POTASSIUM SERPL-SCNC: 3.5 MMOL/L — SIGNIFICANT CHANGE UP (ref 3.5–5.3)
PROTHROM AB SERPL-ACNC: 10.4 SEC — SIGNIFICANT CHANGE UP (ref 10–12.9)
RBC # BLD: 3.54 M/UL — LOW (ref 3.8–5.2)
RBC # FLD: 14.2 % — SIGNIFICANT CHANGE UP (ref 10.3–14.5)
SODIUM SERPL-SCNC: 135 MMOL/L — SIGNIFICANT CHANGE UP (ref 135–145)
SPECIMEN SOURCE: SIGNIFICANT CHANGE UP
WBC # BLD: 4.07 K/UL — SIGNIFICANT CHANGE UP (ref 3.8–10.5)
WBC # FLD AUTO: 4.07 K/UL — SIGNIFICANT CHANGE UP (ref 3.8–10.5)

## 2019-05-04 PROCEDURE — 99233 SBSQ HOSP IP/OBS HIGH 50: CPT

## 2019-05-04 PROCEDURE — 99233 SBSQ HOSP IP/OBS HIGH 50: CPT | Mod: GC

## 2019-05-04 RX ORDER — ATORVASTATIN CALCIUM 80 MG/1
40 TABLET, FILM COATED ORAL AT BEDTIME
Qty: 0 | Refills: 0 | Status: DISCONTINUED | OUTPATIENT
Start: 2019-05-04 | End: 2019-05-06

## 2019-05-04 RX ORDER — LANOLIN ALCOHOL/MO/W.PET/CERES
5 CREAM (GRAM) TOPICAL AT BEDTIME
Qty: 0 | Refills: 0 | Status: DISCONTINUED | OUTPATIENT
Start: 2019-05-04 | End: 2019-05-06

## 2019-05-04 RX ORDER — GEMFIBROZIL 600 MG
600 TABLET ORAL
Qty: 0 | Refills: 0 | Status: DISCONTINUED | OUTPATIENT
Start: 2019-05-04 | End: 2019-05-06

## 2019-05-04 RX ADMIN — Medication 100 MILLIGRAM(S): at 14:16

## 2019-05-04 RX ADMIN — FAMOTIDINE 20 MILLIGRAM(S): 10 INJECTION INTRAVENOUS at 05:43

## 2019-05-04 RX ADMIN — Medication 1 TABLET(S): at 21:53

## 2019-05-04 RX ADMIN — Medication 1 GRAM(S): at 17:58

## 2019-05-04 RX ADMIN — Medication 600 MILLIGRAM(S): at 17:58

## 2019-05-04 RX ADMIN — Medication 2 GRAM(S): at 05:44

## 2019-05-04 RX ADMIN — Medication 100 MILLIGRAM(S): at 21:53

## 2019-05-04 RX ADMIN — FAMOTIDINE 20 MILLIGRAM(S): 10 INJECTION INTRAVENOUS at 17:59

## 2019-05-04 RX ADMIN — Medication 200 MILLIGRAM(S): at 05:43

## 2019-05-04 RX ADMIN — Medication 650 MILLIGRAM(S): at 11:28

## 2019-05-04 RX ADMIN — Medication 200 MILLIGRAM(S): at 17:58

## 2019-05-04 RX ADMIN — Medication 650 MILLIGRAM(S): at 12:28

## 2019-05-04 RX ADMIN — ATORVASTATIN CALCIUM 40 MILLIGRAM(S): 80 TABLET, FILM COATED ORAL at 21:53

## 2019-05-04 RX ADMIN — Medication 1 GRAM(S): at 05:44

## 2019-05-04 RX ADMIN — ONDANSETRON 4 MILLIGRAM(S): 8 TABLET, FILM COATED ORAL at 17:58

## 2019-05-04 RX ADMIN — Medication 1 TABLET(S): at 14:15

## 2019-05-04 RX ADMIN — Medication 2 GRAM(S): at 17:58

## 2019-05-04 RX ADMIN — Medication 5 MILLIGRAM(S): at 23:04

## 2019-05-04 RX ADMIN — Medication 145 MILLIGRAM(S): at 11:28

## 2019-05-04 RX ADMIN — Medication 100 MILLIGRAM(S): at 05:43

## 2019-05-04 RX ADMIN — Medication 1 TABLET(S): at 05:43

## 2019-05-04 NOTE — DIETITIAN INITIAL EVALUATION ADULT. - NS AS NUTRI INTERV ED CONTENT
patient educated on low triglyceride low carbohydrate  diet with fairly good understanding/Nutrition relationship to health/disease

## 2019-05-04 NOTE — PROGRESS NOTE ADULT - SUBJECTIVE AND OBJECTIVE BOX
[facility] -- Adolfo Nicholson, Chau Patel, Jonathan Ferguson Savella  Office # [phone]      Follow Up:    cardiac clearance, hypertrig  Subjective/Observations:   No events overnight resting comfortably in bed.  No complaints of chest pain, dyspnea, or palpitations reported. No signs of orthopnea or PND.     REVIEW OF SYSTEMS: All other review of systems is negative unless indicated above    PAST MEDICAL & SURGICAL HISTORY:  Normal vaginal delivery: 3  High triglycerides  Seasonal allergies  No significant past surgical history      MEDICATIONS  (STANDING):  ciprofloxacin   IVPB 400 milliGRAM(s) IV Intermittent every 12 hours  ciprofloxacin   IVPB      famotidine Injectable 20 milliGRAM(s) IV Push two times a day  fenofibrate Tablet 145 milliGRAM(s) Oral daily  lactobacillus acidophilus 1 Tablet(s) Oral three times a day  metroNIDAZOLE  IVPB      metroNIDAZOLE  IVPB 500 milliGRAM(s) IV Intermittent every 8 hours  omega-3-Acid Ethyl Esters 2 Gram(s) Oral two times a day  sodium chloride 0.9%. 1000 milliLiter(s) (100 mL/Hr) IV Continuous <Continuous>  sucralfate 1 Gram(s) Oral two times a day    MEDICATIONS  (PRN):  acetaminophen   Tablet .. 650 milliGRAM(s) Oral every 6 hours PRN Temp greater or equal to 38C (100.4F)  acetaminophen   Tablet .. 650 milliGRAM(s) Oral every 6 hours PRN Mild Pain (1 - 3)  morphine  - Injectable 1 milliGRAM(s) IV Push every 6 hours PRN Moderate Pain (4 - 6)  morphine  - Injectable 2 milliGRAM(s) IV Push every 6 hours PRN Severe Pain (7 - 10)  ondansetron Injectable 4 milliGRAM(s) IV Push every 6 hours PRN Nausea and/or Vomiting      Allergies    No Known Allergies    Intolerances        Vital Signs Last 24 Hrs  T(C): 36.8 (04 May 2019 08:07), Max: 37.1 (03 May 2019 15:49)  T(F): 98.2 (04 May 2019 08:07), Max: 98.7 (03 May 2019 15:49)  HR: 81 (04 May 2019 08:07) (76 - 82)  BP: 121/81 (04 May 2019 08:07) (107/74 - 124/86)  BP(mean): --  RR: 18 (04 May 2019 08:07) (17 - 18)  SpO2: 98% (04 May 2019 08:07) (98% - 100%)    I&O's Summary    03 May 2019 07:01  -  04 May 2019 07:00  --------------------------------------------------------  IN: 900 mL / OUT: 0 mL / NET: 900 mL          PHYSICAL EXAM:  TELE: SR  Constitutional: NAD, awake and alert, well-developed  HEENT: Moist Mucous Membranes, Anicteric  Pulmonary: Non-labored, breath sounds are clear bilaterally, No wheezing, crackles or rhonchi  Cardiovascular: Regular, S1 and S2 nl, No murmurs, rubs, gallops or clicks  Gastrointestinal: Bowel Sounds present, soft, nontender.   Lymph: No lymphadenopathy. No peripheral edema.  Skin: No visible rashes or ulcers.  Psych:  Mood & affect appropriate    LABS: All Labs Reviewed:                        11.6   4.07  )-----------( 162      ( 04 May 2019 07:28 )             34.7                         11.1   4.79  )-----------( 129      ( 03 May 2019 05:27 )             30.9                         9.0    3.71  )-----------( 139      ( 02 May 2019 14:23 )             27.6     04 May 2019 07:28    135    |  101    |  4      ----------------------------<  140    3.5     |  24     |  0.54   03 May 2019 12:37    136    |  101    |  4      ----------------------------<  159    3.5     |  24     |  0.53   03 May 2019 09:22    133    |  100    |  4      ----------------------------<  148    3.0     |  20     |  0.62     Ca    8.3        04 May 2019 07:28  Ca    7.1        03 May 2019 12:37  Ca    7.2        03 May 2019 09:22  Phos  2.9       03 May 2019 12:37  Phos  1.8       03 May 2019 05:27  Mg     2.4       03 May 2019 12:37  Mg     1.1       03 May 2019 05:27    TPro  x      /  Alb  3.1    /  TBili  x      /  DBili  x      /  AST  x      /  ALT  x      /  AlkPhos  x      03 May 2019 12:37  TPro  6.6    /  Alb  3.5    /  TBili  1.2    /  DBili  x      /  AST  see note  /  ALT  see note  /  AlkPhos  82     02 May 2019 12:41    PT/INR - ( 04 May 2019 07:28 )   PT: 10.4 sec;   INR: 0.91 ratio         PTT - ( 04 May 2019 07:28 )  PTT:30.3 sec         ECG:    Echo:    Radiology:  < from: CT Abdomen and Pelvis w/ Oral Cont and w/ IV Cont (05.02.19 @ 17:00) >    EXAM:  CT ABDOMEN AND PELVIS OC IC                            PROCEDURE DATE:  05/02/2019          INTERPRETATION:  CLINICAL HISTORY: 38 years  Female with mid abd pain.   Spitting up blood.    COMPARISON: None.    PROCEDURE:   CT of the Abdomen and Pelvis was performed with intravenous contrast.   Intravenous contrast: 100 ml Omnipaque 350. 0 ml discarded.  Oral contrast: positive contrast was administered.  Sagittal and coronal reformats were performed.    FINDINGS:    LOWER CHEST: Within normal limits.    LIVER: The liver is low in attenuation secondary to hepatic steatosis.   There is no focal mass or intrahepatic biliary duct distention. The right   lobe is 20.5 cm sagittal.  BILE DUCTS: Normal caliber.  GALLBLADDER: Within normal limits.  SPLEEN: Within normal limits.  PANCREAS: Within normal limits.  ADRENALS: Within normal limits.  KIDNEYS/URETERS: Within normal limits.    BLADDER: Within normal limits.  REPRODUCTIVE ORGANS: IUD in the uterus.    BOWEL: No bowel obstruction. Mildly thickened ascending colon. The   appendix is normal.The stomach is decompressed and cannot be assessed.  PERITONEUM: No ascites. Small mesenteric lymph nodes measuring up to 5 mm   in short axis, not enlarged by CT criteria.  VESSELS:  Within normal limits.  RETROPERITONEUM: No lymphadenopathy.    ABDOMINAL WALL: Minimal fat-containing umbilical hernia.  BONES: Within normal limits.    IMPRESSION:     The stomach is decompressed and cannot be assessed. If there is   hematemesis, upper endoscopy is advised.    Small mesenteric lymph nodes.     Hepatic steatosis and hepatomegaly.    Mildly thickened ascending colon suggestive of colitis.    IUD in the uterus.                NICK MOYA M.D., ATTENDING RADIOLOGIST  This document has been electronically signed. May  2 2019  5:12PM          < end of copied text >           Javy Mckinney ANP   Cardiology [facility] -- Adolfo Nicholson, Chau Patel, Jonathan Ferguson Savella  Office # [phone]      Follow Up:    cardiac clearance, hypertrig    Subjective/Observations:   No events overnight resting comfortably in bed.  No complaints of chest pain, dyspnea, or palpitations reported. No signs of orthopnea or PND.     REVIEW OF SYSTEMS: All other review of systems is negative unless indicated above    PAST MEDICAL & SURGICAL HISTORY:  Normal vaginal delivery: 3  High triglycerides  Seasonal allergies  No significant past surgical history      MEDICATIONS  (STANDING):  ciprofloxacin   IVPB 400 milliGRAM(s) IV Intermittent every 12 hours  ciprofloxacin   IVPB      famotidine Injectable 20 milliGRAM(s) IV Push two times a day  fenofibrate Tablet 145 milliGRAM(s) Oral daily  lactobacillus acidophilus 1 Tablet(s) Oral three times a day  metroNIDAZOLE  IVPB      metroNIDAZOLE  IVPB 500 milliGRAM(s) IV Intermittent every 8 hours  omega-3-Acid Ethyl Esters 2 Gram(s) Oral two times a day  sodium chloride 0.9%. 1000 milliLiter(s) (100 mL/Hr) IV Continuous <Continuous>  sucralfate 1 Gram(s) Oral two times a day    MEDICATIONS  (PRN):  acetaminophen   Tablet .. 650 milliGRAM(s) Oral every 6 hours PRN Temp greater or equal to 38C (100.4F)  acetaminophen   Tablet .. 650 milliGRAM(s) Oral every 6 hours PRN Mild Pain (1 - 3)  morphine  - Injectable 1 milliGRAM(s) IV Push every 6 hours PRN Moderate Pain (4 - 6)  morphine  - Injectable 2 milliGRAM(s) IV Push every 6 hours PRN Severe Pain (7 - 10)  ondansetron Injectable 4 milliGRAM(s) IV Push every 6 hours PRN Nausea and/or Vomiting      Allergies    No Known Allergies    Intolerances        Vital Signs Last 24 Hrs  T(C): 36.8 (04 May 2019 08:07), Max: 37.1 (03 May 2019 15:49)  T(F): 98.2 (04 May 2019 08:07), Max: 98.7 (03 May 2019 15:49)  HR: 81 (04 May 2019 08:07) (76 - 82)  BP: 121/81 (04 May 2019 08:07) (107/74 - 124/86)  BP(mean): --  RR: 18 (04 May 2019 08:07) (17 - 18)  SpO2: 98% (04 May 2019 08:07) (98% - 100%)    I&O's Summary    03 May 2019 07:01  -  04 May 2019 07:00  --------------------------------------------------------  IN: 900 mL / OUT: 0 mL / NET: 900 mL          PHYSICAL EXAM:  TELE: SR  Constitutional: NAD, awake and alert, well-developed  HEENT: Moist Mucous Membranes, Anicteric  Pulmonary: Non-labored, breath sounds are clear bilaterally, No wheezing, crackles or rhonchi  Cardiovascular: Regular, S1 and S2 nl, No murmurs, rubs, gallops or clicks  Gastrointestinal: Bowel Sounds present, soft, nontender.   Lymph: No lymphadenopathy. No peripheral edema.  Skin: No visible rashes or ulcers.  Psych:  Mood & affect appropriate    LABS: All Labs Reviewed:                        11.6   4.07  )-----------( 162      ( 04 May 2019 07:28 )             34.7                         11.1   4.79  )-----------( 129      ( 03 May 2019 05:27 )             30.9                         9.0    3.71  )-----------( 139      ( 02 May 2019 14:23 )             27.6     04 May 2019 07:28    135    |  101    |  4      ----------------------------<  140    3.5     |  24     |  0.54   03 May 2019 12:37    136    |  101    |  4      ----------------------------<  159    3.5     |  24     |  0.53   03 May 2019 09:22    133    |  100    |  4      ----------------------------<  148    3.0     |  20     |  0.62     Ca    8.3        04 May 2019 07:28  Ca    7.1        03 May 2019 12:37  Ca    7.2        03 May 2019 09:22  Phos  2.9       03 May 2019 12:37  Phos  1.8       03 May 2019 05:27  Mg     2.4       03 May 2019 12:37  Mg     1.1       03 May 2019 05:27    TPro  x      /  Alb  3.1    /  TBili  x      /  DBili  x      /  AST  x      /  ALT  x      /  AlkPhos  x      03 May 2019 12:37  TPro  6.6    /  Alb  3.5    /  TBili  1.2    /  DBili  x      /  AST  see note  /  ALT  see note  /  AlkPhos  82     02 May 2019 12:41    PT/INR - ( 04 May 2019 07:28 )   PT: 10.4 sec;   INR: 0.91 ratio         PTT - ( 04 May 2019 07:28 )  PTT:30.3 sec         ECG:    Echo:    Radiology:  < from: CT Abdomen and Pelvis w/ Oral Cont and w/ IV Cont (05.02.19 @ 17:00) >    EXAM:  CT ABDOMEN AND PELVIS OC IC                            PROCEDURE DATE:  05/02/2019          INTERPRETATION:  CLINICAL HISTORY: 38 years  Female with mid abd pain.   Spitting up blood.    COMPARISON: None.    PROCEDURE:   CT of the Abdomen and Pelvis was performed with intravenous contrast.   Intravenous contrast: 100 ml Omnipaque 350. 0 ml discarded.  Oral contrast: positive contrast was administered.  Sagittal and coronal reformats were performed.    FINDINGS:    LOWER CHEST: Within normal limits.    LIVER: The liver is low in attenuation secondary to hepatic steatosis.   There is no focal mass or intrahepatic biliary duct distention. The right   lobe is 20.5 cm sagittal.  BILE DUCTS: Normal caliber.  GALLBLADDER: Within normal limits.  SPLEEN: Within normal limits.  PANCREAS: Within normal limits.  ADRENALS: Within normal limits.  KIDNEYS/URETERS: Within normal limits.    BLADDER: Within normal limits.  REPRODUCTIVE ORGANS: IUD in the uterus.    BOWEL: No bowel obstruction. Mildly thickened ascending colon. The   appendix is normal.The stomach is decompressed and cannot be assessed.  PERITONEUM: No ascites. Small mesenteric lymph nodes measuring up to 5 mm   in short axis, not enlarged by CT criteria.  VESSELS:  Within normal limits.  RETROPERITONEUM: No lymphadenopathy.    ABDOMINAL WALL: Minimal fat-containing umbilical hernia.  BONES: Within normal limits.    IMPRESSION:     The stomach is decompressed and cannot be assessed. If there is   hematemesis, upper endoscopy is advised.    Small mesenteric lymph nodes.     Hepatic steatosis and hepatomegaly.    Mildly thickened ascending colon suggestive of colitis.    IUD in the uterus.                NICK MOYA M.D., ATTENDING RADIOLOGIST  This document has been electronically signed. May  2 2019  5:12PM          < end of copied text >           Javy Mckinney ANP   Cardiology

## 2019-05-04 NOTE — PROGRESS NOTE ADULT - PROBLEM SELECTOR PLAN 2
-Patient states she was on atorvastatin (20 mg)   but she was nauseated from it so stopped  -Triglycerides >5000, repeat 4,000  -STAT ICU consult, possible IV insulin and plasmapheresis but said not a candidate   -Cardio consulted, will start fenofibrate 145 mg daily and fish oil 2g BID  -Patient denying current abdominal pain/nausea    will do mrcp if need as per gi

## 2019-05-04 NOTE — DIETITIAN INITIAL EVALUATION ADULT. - OTHER INFO
patient reports with improvement in watery stools. states tolerated breakfast. states family history high triglycerides but does not take fish oils at home or medication for cholesterol/triglycerides at this time. patient receptive to diet education at this time. MD entered room but education briefly reviewed at this time and left handout with patient. .

## 2019-05-04 NOTE — PROGRESS NOTE ADULT - PROBLEM SELECTOR PLAN 1
-Patient Hb dropped from 12.5 to 9.0, 11.6 on po diet dash /tlc   -CT Abdomen/pelvis consistent with colitis, hepatic steatosis, hepatomegaly, mesenteric lymph nodes. No contrast extravasation. Patient started on  iv cipro/flagly  -Type and Screen  -Zofran q6h PRN nausea  -Pain scale: 650 tylenol mild, 1 mg IV morphine mod, 2 mg IV severe  -Patient declines fecal occult  -Continue pepcid 20 mg IV daily, Carafate  endo Monday

## 2019-05-04 NOTE — PROGRESS NOTE ADULT - SUBJECTIVE AND OBJECTIVE BOX
INTERVAL HPI/OVERNIGHT EVENTS:  no gi symptoms    MEDICATIONS  (STANDING):  ciprofloxacin   IVPB 400 milliGRAM(s) IV Intermittent every 12 hours  ciprofloxacin   IVPB      famotidine Injectable 20 milliGRAM(s) IV Push two times a day  fenofibrate Tablet 145 milliGRAM(s) Oral daily  gemfibrozil 600 milliGRAM(s) Oral two times a day  lactobacillus acidophilus 1 Tablet(s) Oral three times a day  metroNIDAZOLE  IVPB      metroNIDAZOLE  IVPB 500 milliGRAM(s) IV Intermittent every 8 hours  omega-3-Acid Ethyl Esters 2 Gram(s) Oral two times a day  sodium chloride 0.9%. 1000 milliLiter(s) (100 mL/Hr) IV Continuous <Continuous>  sucralfate 1 Gram(s) Oral two times a day    MEDICATIONS  (PRN):  acetaminophen   Tablet .. 650 milliGRAM(s) Oral every 6 hours PRN Temp greater or equal to 38C (100.4F)  acetaminophen   Tablet .. 650 milliGRAM(s) Oral every 6 hours PRN Mild Pain (1 - 3)  morphine  - Injectable 1 milliGRAM(s) IV Push every 6 hours PRN Moderate Pain (4 - 6)  morphine  - Injectable 2 milliGRAM(s) IV Push every 6 hours PRN Severe Pain (7 - 10)  ondansetron Injectable 4 milliGRAM(s) IV Push every 6 hours PRN Nausea and/or Vomiting      Allergies    No Known Allergies    Intolerances        Review of Systems:    General:  No wt loss, fevers, chills, night sweats,fatigue,   Eyes:  Good vision, no reported pain  ENT:  No sore throat, pain, runny nose, dysphagia  CV:  No pain, palpitatioins, hypo/hypertension  Resp:  No dyspnea, cough, tachypnea, wheezing  GI:  No pain, No nausea, No vomiting, No diarrhea, No constipatiion, No weight loss, No fever, No pruritis, No rectal bleeding, No tarry stools, No dysphagia,  :  No pain, bleeding, incontinence, nocturia  Muscle:  No pain, weakness  Neuro:  No weakness, tingling, memory problems  Psych:  No fatigue, insomnia, mood problems, depression  Endocrine:  No polyuria, polydypsia, cold/heat intolerance  Heme:  No petechiae, ecchymosis, easy bruisability  Skin:  No rash, tattoos, scars, edema      Vital Signs Last 24 Hrs  T(C): 37.3 (04 May 2019 12:34), Max: 37.3 (04 May 2019 12:34)  T(F): 99.1 (04 May 2019 12:34), Max: 99.1 (04 May 2019 12:34)  HR: 86 (04 May 2019 12:34) (76 - 86)  BP: 118/75 (04 May 2019 12:34) (107/74 - 124/86)  BP(mean): --  RR: 17 (04 May 2019 12:34) (17 - 18)  SpO2: 99% (04 May 2019 12:34) (98% - 100%)    PHYSICAL EXAM:    Constitutional: NAD, well-developed  HEENT: EOMI, throat clear  Neck: No LAD, supple  Respiratory: CTA and P  Cardiovascular: S1 and S2, RRR, no M  Gastrointestinal: BS+, soft, NT/ND, neg HSM,  Extremities: No peripheral edema, neg clubing, cyanosis  Vascular: 2+ peripheral pulses  Neurological: A/O x 3, no focal deficits  Psychiatric: Normal mood, normal affect  Skin: No rashes      LABS:                        11.6   4.07  )-----------( 162      ( 04 May 2019 07:28 )             34.7     05-04    135  |  101  |  4<L>  ----------------------------<  140<H>  3.5   |  24  |  0.54    Ca    8.3<L>      04 May 2019 07:28  Phos  2.9     05-03  Mg     2.4     05-03    TPro  7.0  /  Alb  3.2<L>  /  TBili  0.7  /  DBili  <.10  /  AST  49<H>  /  ALT  65  /  AlkPhos  73  05-04    PT/INR - ( 04 May 2019 07:28 )   PT: 10.4 sec;   INR: 0.91 ratio         PTT - ( 04 May 2019 07:28 )  PTT:30.3 sec  Urinalysis Basic - ( 03 May 2019 12:53 )    Color: Yellow / Appearance: Clear / S.015 / pH: x  Gluc: x / Ketone: Small  / Bili: Negative / Urobili: Negative   Blood: x / Protein: Negative / Nitrite: Negative   Leuk Esterase: Small / RBC: 0-2 /HPF / WBC 3-5   Sq Epi: x / Non Sq Epi: Moderate / Bacteria: Moderate        RADIOLOGY & ADDITIONAL TESTS:

## 2019-05-04 NOTE — DIETITIAN INITIAL EVALUATION ADULT. - ADHERENCE
patient states she eats healthy no sweets but upon recall noted low fiber diet with high fat foods Tavera and possibly regular soda/fair

## 2019-05-04 NOTE — PROGRESS NOTE ADULT - PROBLEM SELECTOR PLAN 1
ct showed hepatic steatosis and hepatomegaly, mildly thickened ascending colon suggestive of colitis; no scan evidence of pancreatitis, lipase wnl  tg >5000, lipid panel noted, cards following  f/u blood cxs  monitor lytes, replete prn  endo consult, may need insulin gtt  ivf/dm low fat clears as tolerated  cont pepcid, carafate, anti emetics prn  pain control  monitor exam

## 2019-05-04 NOTE — DIETITIAN INITIAL EVALUATION ADULT. - PROBLEM SELECTOR PLAN 1
-Patient Hb dropped from 12.5 to 9.0  -F/U STAT CT abd pelvis with contrast   -Diet NPO  -GI sideridis, endoscopy in a.m.   -Type and Screen  -Zofran q6h PRN nausea  -Pain scale: 650 tylenol mild, 1 mg IV morphine mod, 2 mg IV severe  -F/U fecal occult  -Continue pepcid 20 mg IV daily, carafate  -Note flu negative

## 2019-05-04 NOTE — PROGRESS NOTE ADULT - ASSESSMENT
38 F PMHx of elevated triglycerides presents to ED c/o of abdominal pain admitted for GI bleed.     - No clear evidence of acute ischemia  - No evidence of volume overload  - No acute changes on EKG, was sinus tachycardia   - Tachy was likely reactive to dehydration    - Previous ECHO unknown. Check ECHO       - Patient is NOT medically optimized for low risk procedure today given severely elevated TG and electrolyte abnormalities) today.  - Start Fenofibrate 145 mg PO Daily and fish oil 2 grams BID  - Serial lipid profiles.   - lipid panels improving will start gemfibrozil 600 mg twice daily   - FU LFTs prior to initiating lipitor 40 daily    - Currently no signs of active pancreatitis given normal amylase and lipase, but I feel that she is more symptomatic for her hypertriglyerides.  - Other cardiovascular workup will depend on clinical course.  - All other workup per primary team  - Will follow   Javy Mckinney ANP  Cardiology 38 F PMHx of elevated triglycerides presents to ED c/o of abdominal pain admitted for GI bleed.     - No clear evidence of acute ischemia  - No evidence of volume overload  - No acute changes on EKG, was sinus tachycardia   - Tachy was likely reactive to dehydration    - Previous ECHO unknown. Check ECHO       - Hypertrigs improved but still extremely high.   - Monitor clinically for development of pancreatitis as pt is high risk.,   - Cont Fenofibrate 145 mg PO Daily and fish oil 2 grams BID  - Serial lipid profiles.   - will start gemfibrozil 600 mg twice daily   - FU LFTs prior to initiating lipitor 40 daily     .  - Other cardiovascular workup will depend on clinical course.  - All other workup per primary team  - Will follow   Javy Mckinney [facility]  Cardiology

## 2019-05-04 NOTE — PROGRESS NOTE ADULT - SUBJECTIVE AND OBJECTIVE BOX
Patient is a 38y old  Female who presents with a chief complaint of GI bleed (04 May 2019 13:48)      HPI:  38 F PMHx of elevated triglycerides presents to ED c/o of abdominal pain. Patient states she awoke at 8:00 a.m., and noticed some moderate abdominal pain and took a total 400 mg advil. An hour later, she felt feverish (but did not take temp) with shaking, body aches and chills, and she ate a small amount of her sandwich but vomited 30 minutes later. Patient vomited a second time hours later, and this time noticed a cup of bright red blood. Denies recent illness, travel, eating raw meat/fish, cough, SOB, chest pain, diarrhea or constipation. Note patient unsure of LMP due to Mirena placement 3 years ago and only has irregular spotting on occasion.        admitted for GI bleed,  hx severe hypertriglyceridemia.     INTERVAL HPI/OVERNIGHT EVENTS: No acute events overnight. Admits diarrhea after  bowel prep  now resolved  .   Patient denies   no nausea  , no vomiting  no  abdominal pain , no diarrhea    tolerating po  .     INTERVAL HPI/OVERNIGHT EVENTS:  T(C): 37.3 (19 @ 12:34), Max: 37.3 (19 @ 12:34)  HR: 86 (19 @ 12:34) (76 - 86)  BP: 118/75 (19 @ 12:34) (111/78 - 124/86)  RR: 17 (19 @ 12:34) (17 - 18)  SpO2: 99% (19 @ 12:34) (98% - 100%)  Wt(kg): --  I&O's Summary    03 May 2019 07:  -  04 May 2019 07:00  --------------------------------------------------------  IN: 900 mL / OUT: 0 mL / NET: 900 mL    04 May 2019 07:  -  04 May 2019 16:15  --------------------------------------------------------  IN: 580 mL / OUT: 0 mL / NET: 580 mL            REVIEW OF SYSTEMS:  CONSTITUTIONAL: No fever, weight loss, or fatigue  EYES: No eye pain, visual disturbances, or discharge,   RESPIRATORY: No cough, wheezing, chills No shortness of breath  CARDIOVASCULAR: No chest pain, palpitations, dizziness, or leg swelling  GASTROINTESTINAL: No abdominal or epigastric pain. No nausea, vomiting,  - diarrhea  GENITOURINARY: No dysuria, frequency, hematuria, or incontinence  NEUROLOGICAL: No headaches, memory loss, loss of strength, admits to lower extremity peripheral neuropathy  SKIN: No itching, burning  MUSCULOSKELETAL: No joint pain or swelling; No muscle, back, or extremity pain    PHYSICAL EXAM:  GENERAL: NAD, well-groomed, well-developed  HEAD:  Atraumatic, Normocephalic  EYES: EOMI, PERRLA, conjunctiva and sclera clear  ENMT: Moist mucous membranes  NECK: Supple, No JVD,  NERVOUS SYSTEM:  Alert & Oriented X3, Good concentration; 5/5   motor /sensory intact   Motor Strength 5/5 B/L upper and lower extremities  CHEST/LUNG: Clear to percussion bilaterally; No rales, rhonchi, wheezing,  HEART: Regular rate and rhythm; No murmurs, no tachy   ABDOMEN: Soft,  no  tender, normoactive bowel sounds  EXTREMITIES:  2+ Peripheral Pulses, No clubbing, cyanosis, or edema    SKIN: Warm, dry, upper back tattoo, umbilicus jewelry          MEDICATIONS  (STANDING):  atorvastatin 40 milliGRAM(s) Oral at bedtime  ciprofloxacin   IVPB 400 milliGRAM(s) IV Intermittent every 12 hours  ciprofloxacin   IVPB      famotidine Injectable 20 milliGRAM(s) IV Push two times a day  fenofibrate Tablet 145 milliGRAM(s) Oral daily  gemfibrozil 600 milliGRAM(s) Oral two times a day  lactobacillus acidophilus 1 Tablet(s) Oral three times a day  metroNIDAZOLE  IVPB      metroNIDAZOLE  IVPB 500 milliGRAM(s) IV Intermittent every 8 hours  omega-3-Acid Ethyl Esters 2 Gram(s) Oral two times a day  sodium chloride 0.9%. 1000 milliLiter(s) (100 mL/Hr) IV Continuous <Continuous>  sucralfate 1 Gram(s) Oral two times a day    MEDICATIONS  (PRN):  acetaminophen   Tablet .. 650 milliGRAM(s) Oral every 6 hours PRN Temp greater or equal to 38C (100.4F)  acetaminophen   Tablet .. 650 milliGRAM(s) Oral every 6 hours PRN Mild Pain (1 - 3)  ondansetron Injectable 4 milliGRAM(s) IV Push every 6 hours PRN Nausea and/or Vomiting      LABS:                        11.6   4.07  )-----------( 162      ( 04 May 2019 07:28 )             34.7     05-04    135  |  101  |  4<L>  ----------------------------<  140<H>  3.5   |  24  |  0.54    Ca    8.3<L>      04 May 2019 07:28  Phos  2.9     05-03  Mg     2.4     05-03    TPro  7.0  /  Alb  3.2<L>  /  TBili  0.7  /  DBili  <.10  /  AST  49<H>  /  ALT  65  /  AlkPhos  73  05-04    PT/INR - ( 04 May 2019 07:28 )   PT: 10.4 sec;   INR: 0.91 ratio         PTT - ( 04 May 2019 07:28 )  PTT:30.3 sec  Urinalysis Basic - ( 03 May 2019 12:53 )    Color: Yellow / Appearance: Clear / S.015 / pH: x  Gluc: x / Ketone: Small  / Bili: Negative / Urobili: Negative   Blood: x / Protein: Negative / Nitrite: Negative   Leuk Esterase: Small / RBC: 0-2 /HPF / WBC 3-5   Sq Epi: x / Non Sq Epi: Moderate / Bacteria: Moderate      CAPILLARY BLOOD GLUCOSE          - @ 12:17   GI PCR Results: NOT detected  *******Please Note:*******  GI panel PCR evaluates for:  Campylobacter, Plesiomonas shigelloides, Salmonella,  Vibrio, Yersinia enterocolitica, Enteroaggregative  Escherichia coli (EAEC), Enteropathogenic E.coli (EPEC),  Enterotoxigenic E. coli (ETEC) lt/st, Shiga-like  toxin-producing E. coli (STEC) stx1/stx2,  Shigella/ Enteroinvasive E. coli (EIEC), Cryptosporidium,  Cyclospora cayetanensis, Entamoeba histolytica,  Giardia lamblia, Adenovirus F 40/41, Astrovirus,  Norovirus GI/GII, Rotavirus A, Sapovirus  --  --   @ 19:05   No growth to date.  --  --          RADIOLOGY & ADDITIONAL TESTS:    Imaging Personally Reviewed:     no new test   Advance Directives:  full code

## 2019-05-04 NOTE — PROGRESS NOTE ADULT - ASSESSMENT
38 F PMHx of elevated triglycerides presents to ED c/o of abdominal pain admitted for GI bleed,  hx of  severe hypertriglyceridemia  with diarrhea found to have colitis

## 2019-05-04 NOTE — PHARMACOTHERAPY INTERVENTION NOTE - COMMENTS
Patient had orders for morphine 1mg IV PRN moderate pain and morphine 2mg IV PRN severe pain.  Alerted the physician that as of today, no doses have been administered to the patient for 2 days and recommended discontinuing the medications in an effort to decrease the use of opiates.  Physician agreed, orders discontinued.
Patient receiving IV ciprofloxacin and metronidazole for colitis. Patient now on regular diet and tolerating other PO medications. Since conversion is 1:1, IV:PO, spoke with physician to recommend changing to PO antibiotics. Physician would like to continue IV ABx for now until fevers resolve.

## 2019-05-05 LAB
ALBUMIN SERPL ELPH-MCNC: 3.4 G/DL — SIGNIFICANT CHANGE UP (ref 3.3–5)
ALP SERPL-CCNC: 70 U/L — SIGNIFICANT CHANGE UP (ref 40–120)
ALT FLD-CCNC: 66 U/L — SIGNIFICANT CHANGE UP (ref 12–78)
ANION GAP SERPL CALC-SCNC: 7 MMOL/L — SIGNIFICANT CHANGE UP (ref 5–17)
AST SERPL-CCNC: 43 U/L — HIGH (ref 15–37)
BASOPHILS # BLD AUTO: 0.03 K/UL — SIGNIFICANT CHANGE UP (ref 0–0.2)
BASOPHILS NFR BLD AUTO: 0.8 % — SIGNIFICANT CHANGE UP (ref 0–2)
BILIRUB DIRECT SERPL-MCNC: 0.2 MG/DL — SIGNIFICANT CHANGE UP (ref 0.05–0.2)
BILIRUB INDIRECT FLD-MCNC: 0.5 MG/DL — SIGNIFICANT CHANGE UP (ref 0.2–1)
BILIRUB SERPL-MCNC: 0.7 MG/DL — SIGNIFICANT CHANGE UP (ref 0.2–1.2)
BUN SERPL-MCNC: 6 MG/DL — LOW (ref 7–23)
CALCIUM SERPL-MCNC: 8.9 MG/DL — SIGNIFICANT CHANGE UP (ref 8.5–10.1)
CHLORIDE SERPL-SCNC: 105 MMOL/L — SIGNIFICANT CHANGE UP (ref 96–108)
CO2 SERPL-SCNC: 27 MMOL/L — SIGNIFICANT CHANGE UP (ref 22–31)
CREAT SERPL-MCNC: 0.6 MG/DL — SIGNIFICANT CHANGE UP (ref 0.5–1.3)
EOSINOPHIL # BLD AUTO: 0.12 K/UL — SIGNIFICANT CHANGE UP (ref 0–0.5)
EOSINOPHIL NFR BLD AUTO: 3.1 % — SIGNIFICANT CHANGE UP (ref 0–6)
GLUCOSE SERPL-MCNC: 125 MG/DL — HIGH (ref 70–99)
HCT VFR BLD CALC: 34.8 % — SIGNIFICANT CHANGE UP (ref 34.5–45)
HGB BLD-MCNC: 11.5 G/DL — SIGNIFICANT CHANGE UP (ref 11.5–15.5)
IMM GRANULOCYTES NFR BLD AUTO: 0.3 % — SIGNIFICANT CHANGE UP (ref 0–1.5)
LYMPHOCYTES # BLD AUTO: 1.3 K/UL — SIGNIFICANT CHANGE UP (ref 1–3.3)
LYMPHOCYTES # BLD AUTO: 33.3 % — SIGNIFICANT CHANGE UP (ref 13–44)
MCHC RBC-ENTMCNC: 32.2 PG — SIGNIFICANT CHANGE UP (ref 27–34)
MCHC RBC-ENTMCNC: 33 GM/DL — SIGNIFICANT CHANGE UP (ref 32–36)
MCV RBC AUTO: 97.5 FL — SIGNIFICANT CHANGE UP (ref 80–100)
MONOCYTES # BLD AUTO: 0.38 K/UL — SIGNIFICANT CHANGE UP (ref 0–0.9)
MONOCYTES NFR BLD AUTO: 9.7 % — SIGNIFICANT CHANGE UP (ref 2–14)
NEUTROPHILS # BLD AUTO: 2.06 K/UL — SIGNIFICANT CHANGE UP (ref 1.8–7.4)
NEUTROPHILS NFR BLD AUTO: 52.8 % — SIGNIFICANT CHANGE UP (ref 43–77)
NRBC # BLD: 0 /100 WBCS — SIGNIFICANT CHANGE UP (ref 0–0)
PLATELET # BLD AUTO: 164 K/UL — SIGNIFICANT CHANGE UP (ref 150–400)
POTASSIUM SERPL-MCNC: 3.9 MMOL/L — SIGNIFICANT CHANGE UP (ref 3.5–5.3)
POTASSIUM SERPL-SCNC: 3.9 MMOL/L — SIGNIFICANT CHANGE UP (ref 3.5–5.3)
PROT SERPL-MCNC: 7.2 G/DL — SIGNIFICANT CHANGE UP (ref 6–8.3)
RBC # BLD: 3.57 M/UL — LOW (ref 3.8–5.2)
RBC # FLD: 14.1 % — SIGNIFICANT CHANGE UP (ref 10.3–14.5)
SODIUM SERPL-SCNC: 139 MMOL/L — SIGNIFICANT CHANGE UP (ref 135–145)
WBC # BLD: 3.9 K/UL — SIGNIFICANT CHANGE UP (ref 3.8–10.5)
WBC # FLD AUTO: 3.9 K/UL — SIGNIFICANT CHANGE UP (ref 3.8–10.5)

## 2019-05-05 PROCEDURE — 99233 SBSQ HOSP IP/OBS HIGH 50: CPT | Mod: GC

## 2019-05-05 PROCEDURE — 99232 SBSQ HOSP IP/OBS MODERATE 35: CPT

## 2019-05-05 PROCEDURE — 93306 TTE W/DOPPLER COMPLETE: CPT | Mod: 26

## 2019-05-05 RX ORDER — CIPROFLOXACIN LACTATE 400MG/40ML
400 VIAL (ML) INTRAVENOUS EVERY 12 HOURS
Qty: 0 | Refills: 0 | Status: COMPLETED | OUTPATIENT
Start: 2019-05-05 | End: 2019-05-05

## 2019-05-05 RX ORDER — METRONIDAZOLE 500 MG
500 TABLET ORAL EVERY 8 HOURS
Qty: 0 | Refills: 0 | Status: COMPLETED | OUTPATIENT
Start: 2019-05-05 | End: 2019-05-05

## 2019-05-05 RX ORDER — CIPROFLOXACIN LACTATE 400MG/40ML
500 VIAL (ML) INTRAVENOUS EVERY 12 HOURS
Qty: 0 | Refills: 0 | Status: DISCONTINUED | OUTPATIENT
Start: 2019-05-06 | End: 2019-05-06

## 2019-05-05 RX ORDER — METRONIDAZOLE 500 MG
500 TABLET ORAL EVERY 8 HOURS
Qty: 0 | Refills: 0 | Status: DISCONTINUED | OUTPATIENT
Start: 2019-05-06 | End: 2019-05-06

## 2019-05-05 RX ADMIN — Medication 200 MILLIGRAM(S): at 05:46

## 2019-05-05 RX ADMIN — Medication 1 TABLET(S): at 21:06

## 2019-05-05 RX ADMIN — Medication 100 MILLIGRAM(S): at 21:06

## 2019-05-05 RX ADMIN — Medication 145 MILLIGRAM(S): at 11:51

## 2019-05-05 RX ADMIN — Medication 600 MILLIGRAM(S): at 05:46

## 2019-05-05 RX ADMIN — Medication 1 TABLET(S): at 14:32

## 2019-05-05 RX ADMIN — Medication 100 MILLIGRAM(S): at 14:32

## 2019-05-05 RX ADMIN — Medication 600 MILLIGRAM(S): at 17:39

## 2019-05-05 RX ADMIN — Medication 2 GRAM(S): at 17:39

## 2019-05-05 RX ADMIN — Medication 2 GRAM(S): at 05:46

## 2019-05-05 RX ADMIN — ATORVASTATIN CALCIUM 40 MILLIGRAM(S): 80 TABLET, FILM COATED ORAL at 21:06

## 2019-05-05 RX ADMIN — Medication 1 GRAM(S): at 17:39

## 2019-05-05 RX ADMIN — Medication 100 MILLIGRAM(S): at 05:46

## 2019-05-05 RX ADMIN — FAMOTIDINE 20 MILLIGRAM(S): 10 INJECTION INTRAVENOUS at 05:46

## 2019-05-05 RX ADMIN — Medication 200 MILLIGRAM(S): at 17:39

## 2019-05-05 RX ADMIN — Medication 1 GRAM(S): at 05:46

## 2019-05-05 RX ADMIN — FAMOTIDINE 20 MILLIGRAM(S): 10 INJECTION INTRAVENOUS at 17:38

## 2019-05-05 RX ADMIN — Medication 1 TABLET(S): at 05:46

## 2019-05-05 RX ADMIN — Medication 5 MILLIGRAM(S): at 21:06

## 2019-05-05 NOTE — PROGRESS NOTE ADULT - PROBLEM SELECTOR PLAN 3
-Na 134 today, possibly from vomiting but only 2 episodes  -S/P 2 L NS bolus  -Continue IV Fluids @ 100 cc/hr
ct as above  looses stools sp contrast  check gi pcr if persist  abx per primary  rec bacid tid  monitor stool frequency/consistency
-Na 134 today, possibly from vomiting but only 2 episodes  -S/P 2 L NS bolus  -Continue IV Fluids @ 100 cc/hr
ct as above  looses stools sp contrast  check gi pcr if persist  abx per primary  rec bacid tid  monitor stool frequency/consistency
ct as above  looses stools sp contrast  check gi pcr if persist  abx per primary  rec bacid tid  monitor stool frequency/consistency
hypovolumic  -Na 134  - now 136 , possibly from sec to  vomiting  dehydration - resolved .  -Continue IV Fluids @ 100 cc/hr as npo midnight .  hypomagnesia ,hypocalcemia and hypokalemia replaced - stable , ca. k, mag.

## 2019-05-05 NOTE — PROGRESS NOTE ADULT - PROBLEM SELECTOR PLAN 4
-No Hx of DM. hb a1c 5.1 .
prior to admission, no further episodes  monitor cbc, transfuse prn  cont pepcid/carafate  hold ac asa nsaids  plan for egd possibly monday if medically optimized      plan dw attg, agreeable, will follow
-No Hx of DM.  -F/U with Hba1c.
-No Hx of DM. hb a1c 5.1 .
prior to admission, no further episodes  monitor cbc, transfuse prn  cont pepcid/carafate  hold ac asa nsaids  egd tomorrow      plan dw attg, agreeable, will follow
prior to admission, no further episodes  monitor cbc, transfuse prn  cont pepcid/carafate  hold ac asa nsaids  plan for egd possibly monday if medically optimized      plan dw attg, agreeable, will follow

## 2019-05-05 NOTE — PROGRESS NOTE ADULT - SUBJECTIVE AND OBJECTIVE BOX
Patient is a 38y old  Female who presents with a chief complaint of GI bleed (04 May 2019 16:15)      HPI:  38 F PMHx of elevated triglycerides presents to ED c/o of abdominal pain. Patient states she awoke at 8:00 a.m., and noticed some moderate abdominal pain and took a total 400 mg advil. An hour later, she felt feverish (but did not take temp) with shaking, body aches and chills, and she ate a small amount of her sandwich but vomited 30 minutes later. Patient vomited a second time hours later, and this time noticed a cup of bright red blood. Denies recent illness, travel, eating raw meat/fish, cough, SOB, chest pain, diarrhea or constipation. Note patient unsure of LMP due to Mirena placement 3 years ago and only has irregular spotting on occasion.        admitted for   nausea /vomiting GI bleed ? , found to have colitis   with   hx of  severe  familial hypertriglyceridemia.     INTERVAL HPI/OVERNIGHT EVENTS: pt seen and examine alert awake   Patient denies   no nausea  , no vomiting  no  abdominal pain , no diarrhea    tolerating po  .    INTERVAL HPI/OVERNIGHT EVENTS:  T(C): 36.4 (19 @ 08:10), Max: 37.3 (19 @ 12:34)  HR: 77 (19 @ 08:10) (73 - 97)  BP: 109/80 (19 @ 08:10) (109/80 - 122/84)  RR: 17 (19 @ 08:10) (17 - 18)  SpO2: 98% (19 @ 08:10) (98% - 100%)  Wt(kg): --  I&O's Summary    04 May 2019 07:01  -  05 May 2019 07:00  --------------------------------------------------------  IN: 780 mL / OUT: 0 mL / NET: 780 mL              REVIEW OF SYSTEMS:  CONSTITUTIONAL: No fever, weight loss, or fatigue  EYES: No eye pain, visual disturbances, or discharge,   RESPIRATORY: No cough, wheezing, chills No shortness of breath  CARDIOVASCULAR: No chest pain, palpitations, dizziness, or leg swelling  GASTROINTESTINAL: No abdominal or epigastric pain. No nausea, vomiting,  no diarrhea  GENITOURINARY: No dysuria, frequency, hematuria, or incontinence  NEUROLOGICAL: No headaches, memory loss, loss of strength,   SKIN: No itching, burning  MUSCULOSKELETAL: No joint pain or swelling; No muscle, back, or extremity pain    PHYSICAL EXAM:  GENERAL: NAD, well-groomed, well-developed  HEAD:  Atraumatic, Normocephalic  EYES: EOMI, PERRLA, conjunctiva and sclera clear  ENMT: Moist mucous membranes  NECK: Supple, No JVD,  NERVOUS SYSTEM:  Alert & Oriented X3, Good concentration; 5/5   motor /sensory intact   Motor Strength 5/5 B/L upper and lower extremities  CHEST/LUNG: Clear to percussion bilaterally; No rales, rhonchi, wheezing,  HEART: Regular rate and rhythm; No murmurs, no tachy   ABDOMEN: Soft,  no  tender, normoactive bowel sounds  EXTREMITIES:  2+ Peripheral Pulses, No clubbing, cyanosis, or edema  SKIN: Warm, dry, upper back tattoo, umbilicus jewelry        MEDICATIONS  (STANDING):  atorvastatin 40 milliGRAM(s) Oral at bedtime  ciprofloxacin   IVPB 400 milliGRAM(s) IV Intermittent every 12 hours  ciprofloxacin   IVPB      famotidine Injectable 20 milliGRAM(s) IV Push two times a day  fenofibrate Tablet 145 milliGRAM(s) Oral daily  gemfibrozil 600 milliGRAM(s) Oral two times a day  lactobacillus acidophilus 1 Tablet(s) Oral three times a day  melatonin 5 milliGRAM(s) Oral at bedtime  metroNIDAZOLE  IVPB      metroNIDAZOLE  IVPB 500 milliGRAM(s) IV Intermittent every 8 hours  omega-3-Acid Ethyl Esters 2 Gram(s) Oral two times a day  sodium chloride 0.9%. 1000 milliLiter(s) (100 mL/Hr) IV Continuous <Continuous>  sucralfate 1 Gram(s) Oral two times a day    MEDICATIONS  (PRN):  acetaminophen   Tablet .. 650 milliGRAM(s) Oral every 6 hours PRN Temp greater or equal to 38C (100.4F)  acetaminophen   Tablet .. 650 milliGRAM(s) Oral every 6 hours PRN Mild Pain (1 - 3)  ondansetron Injectable 4 milliGRAM(s) IV Push every 6 hours PRN Nausea and/or Vomiting      LABS:                        11.6   4.07  )-----------( 162      ( 04 May 2019 07:28 )             34.7     05-    135  |  101  |  4<L>  ----------------------------<  140<H>  3.5   |  24  |  0.54    Ca    8.3<L>      04 May 2019 07:28  Phos  2.9     05-  Mg     2.4     05-    TPro  7.0  /  Alb  3.2<L>  /  TBili  0.7  /  DBili  <.10  /  AST  49<H>  /  ALT  65  /  AlkPhos  73  -    PT/INR - ( 04 May 2019 07:28 )   PT: 10.4 sec;   INR: 0.91 ratio         PTT - ( 04 May 2019 07:28 )  PTT:30.3 sec  Urinalysis Basic - ( 03 May 2019 12:53 )    Color: Yellow / Appearance: Clear / S.015 / pH: x  Gluc: x / Ketone: Small  / Bili: Negative / Urobili: Negative   Blood: x / Protein: Negative / Nitrite: Negative   Leuk Esterase: Small / RBC: 0-2 /HPF / WBC 3-5   Sq Epi: x / Non Sq Epi: Moderate / Bacteria: Moderate      CAPILLARY BLOOD GLUCOSE           @ 12:17   GI PCR Results: NOT detected  *******Please Note:*******  GI panel PCR evaluates for:  Campylobacter, Plesiomonas shigelloides, Salmonella,  Vibrio, Yersinia enterocolitica, Enteroaggregative  Escherichia coli (EAEC), Enteropathogenic E.coli (EPEC),  Enterotoxigenic E. coli (ETEC) lt/st, Shiga-like  toxin-producing E. coli (STEC) stx1/stx2,  Shigella/ Enteroinvasive E. coli (EIEC), Cryptosporidium,  Cyclospora cayetanensis, Entamoeba histolytica,  Giardia lamblia, Adenovirus F 40/41, Astrovirus,  Norovirus GI/GII, Rotavirus A, Sapovirus  --  --   @ 19:05   No growth to date.  --  --          RADIOLOGY & ADDITIONAL TESTS:    Imaging Personally Reviewed:     no new test   Advance Directives:    full code

## 2019-05-05 NOTE — PROGRESS NOTE ADULT - ASSESSMENT
38 F PMHx of elevated triglycerides presents to ED c/o of abdominal pain admitted for GI bleed.     - No clear evidence of acute ischemia  - No evidence of volume overload  - No acute changes on EKG, was sinus tachycardia   - Tachy was likely reactive to dehydration  - ECHO pending     - Hypertrigs improved but still very high.   - Monitor clinically for development of pancreatitis as pt is high risk.,   - Cont Fenofibrate 145 mg PO Daily and fish oil 2 grams BID  - cont gemfibrozil 600 mg twice daily   - monitor LFTs daily  - Serial lipid profiles.   - from cardiac stand point, patient cleared for endoscopy in am 5/6 ( NPO after MN for procedure please) with routine cardiac monitoring and vital signs      - Other cardiovascular workup will depend on clinical course.  - All other workup per primary team  - Will follow       Sylvia Fuentes NP  Cardiology 38 F PMHx of elevated triglycerides presents to ED c/o of abdominal pain admitted for GI bleed.     - No clear evidence of acute ischemia  - No evidence of volume overload  - No acute changes on EKG, was sinus tachycardia   - Tachy was likely reactive to dehydration  - ECHO pending     - Hypertrigs improved but still very high.   - Monitor clinically for development of pancreatitis as pt is high risk.,   - Cont Fenofibrate 145 mg PO Daily and fish oil 2 grams BID  - cont gemfibrozil 600 mg twice daily   - Cont Lipitor 40mg HS  - monitor LFTs daily  - Serial lipid profiles.   - from cardiac stand point, patient optimized for endoscopy in am 5/6 ( NPO after MN for procedure please) with routine cardiac monitoring and vital signs      - Other cardiovascular workup will depend on clinical course.  - All other workup per primary team  - Will follow       Sylvia Fuentes NP  Cardiology

## 2019-05-05 NOTE — PROGRESS NOTE ADULT - PROBLEM SELECTOR PROBLEM 2
Elevated triglycerides with high cholesterol
Hypertriglyceridemia
Elevated triglycerides with high cholesterol
Elevated triglycerides with high cholesterol
Hypertriglyceridemia
Hypertriglyceridemia

## 2019-05-05 NOTE — PROGRESS NOTE ADULT - ASSESSMENT
38 F PMHx of elevated triglycerides presents to ED c/o of abdominal pain admitted for nausea/ vomiting r/o  GI bleed,  hx of  severe hypertriglyceridemia  with diarrhea found to have colitis

## 2019-05-05 NOTE — PROGRESS NOTE ADULT - PROBLEM SELECTOR PROBLEM 1
Abdominal pain
Gastrointestinal hemorrhage, unspecified gastrointestinal hemorrhage type
Abdominal pain
Abdominal pain
Gastrointestinal hemorrhage, unspecified gastrointestinal hemorrhage type
Gastrointestinal hemorrhage, unspecified gastrointestinal hemorrhage type

## 2019-05-05 NOTE — PROGRESS NOTE ADULT - SUBJECTIVE AND OBJECTIVE BOX
[facility] -- Adolfo Nicholson, Chau Patel, Jonathan Ferguson Savella  Office # [phone]      Follow Up:  cardiac clearance, hypertrig    Subjective/Observations:  patient seen and examined. No events overnight resting comfortably in chair having lunch.  No complaints of chest pain, dyspnea, or palpitations reported. No signs of orthopnea or PND.       REVIEW OF SYSTEMS: All other review of systems is negative unless indicated above    PAST MEDICAL & SURGICAL HISTORY:  Normal vaginal delivery: 3  High triglycerides  Seasonal allergies  No significant past surgical history      MEDICATIONS  (STANDING):  atorvastatin 40 milliGRAM(s) Oral at bedtime  ciprofloxacin   IVPB 400 milliGRAM(s) IV Intermittent every 12 hours  famotidine Injectable 20 milliGRAM(s) IV Push two times a day  fenofibrate Tablet 145 milliGRAM(s) Oral daily  gemfibrozil 600 milliGRAM(s) Oral two times a day  lactobacillus acidophilus 1 Tablet(s) Oral three times a day  melatonin 5 milliGRAM(s) Oral at bedtime  metroNIDAZOLE  IVPB 500 milliGRAM(s) IV Intermittent every 8 hours  omega-3-Acid Ethyl Esters 2 Gram(s) Oral two times a day  sodium chloride 0.9%. 1000 milliLiter(s) (100 mL/Hr) IV Continuous <Continuous>  sucralfate 1 Gram(s) Oral two times a day    MEDICATIONS  (PRN):  acetaminophen   Tablet .. 650 milliGRAM(s) Oral every 6 hours PRN Temp greater or equal to 38C (100.4F)  acetaminophen   Tablet .. 650 milliGRAM(s) Oral every 6 hours PRN Mild Pain (1 - 3)  ondansetron Injectable 4 milliGRAM(s) IV Push every 6 hours PRN Nausea and/or Vomiting      Allergies    No Known Allergies    Intolerances            Vital Signs Last 24 Hrs  T(C): 36.6 (05 May 2019 12:20), Max: 37.2 (05 May 2019 05:00)  T(F): 97.8 (05 May 2019 12:20), Max: 98.9 (05 May 2019 05:00)  HR: 78 (05 May 2019 12:20) (73 - 97)  BP: 102/71 (05 May 2019 12:20) (102/71 - 122/84)  BP(mean): --  RR: 17 (05 May 2019 12:20) (17 - 18)  SpO2: 98% (05 May 2019 12:20) (98% - 100%)    I&O's Summary    04 May 2019 07:01  -  05 May 2019 07:00  --------------------------------------------------------  IN: 780 mL / OUT: 0 mL / NET: 780 mL          PHYSICAL EXAM:  TELE: SR  bpm, no events   Constitutional: NAD, awake and alert, well-developed  HEENT: Moist Mucous Membranes, Anicteric  Pulmonary: Non-labored, breath sounds are clear bilaterally, No wheezing, rales or rhonchi  Cardiovascular: Regular, S1 and S2, No murmurs, rubs, gallops or clicks  Gastrointestinal: Bowel Sounds present, soft, nontender.   Lymph: No peripheral edema. No lymphadenopathy.  Skin: No visible rashes or ulcers.  Psych:  Mood & affect appropriate    LABS: All Labs Reviewed:                        11.5   3.90  )-----------( 164      ( 05 May 2019 12:37 )             34.8                         11.6   4.07  )-----------( 162      ( 04 May 2019 07:28 )             34.7                         11.1   4.79  )-----------( 129      ( 03 May 2019 05:27 )             30.9     05 May 2019 12:37    139    |  105    |  6      ----------------------------<  125    3.9     |  27     |  0.60   04 May 2019 07:28    135    |  101    |  4      ----------------------------<  140    3.5     |  24     |  0.54   03 May 2019 12:37    136    |  101    |  4      ----------------------------<  159    3.5     |  24     |  0.53     Ca    8.9        05 May 2019 12:37  Ca    8.3        04 May 2019 07:28  Ca    7.1        03 May 2019 12:37  Phos  2.9       03 May 2019 12:37  Phos  1.8       03 May 2019 05:27  Mg     2.4       03 May 2019 12:37  Mg     1.1       03 May 2019 05:27    TPro  7.2    /  Alb  3.4    /  TBili  0.7    /  DBili  .20    /  AST  43     /  ALT  66     /  AlkPhos  70     05 May 2019 12:37  TPro  7.0    /  Alb  3.2    /  TBili  0.7    /  DBili  <.10   /  AST  49     /  ALT  65     /  AlkPhos  73     04 May 2019 07:28  TPro  x      /  Alb  3.1    /  TBili  x      /  DBili  x      /  AST  x      /  ALT  x      /  AlkPhos  x      03 May 2019 12:37    PT/INR - ( 04 May 2019 07:28 )   PT: 10.4 sec;   INR: 0.91 ratio         PTT - ( 04 May 2019 07:28 )  PTT:30.3 sec     from: CT Abdomen and Pelvis w/ Oral Cont and w/ IV Cont (05.02.19 @ 17:00) >    EXAM:  CT ABDOMEN AND PELVIS OC IC                            PROCEDURE DATE:  05/02/2019          INTERPRETATION:  CLINICAL HISTORY: 38 years  Female with mid abd pain.   Spitting up blood.    COMPARISON: None.    PROCEDURE:   CT of the Abdomen and Pelvis was performed with intravenous contrast.   Intravenous contrast: 100 ml Omnipaque 350. 0 ml discarded.  Oral contrast: positive contrast was administered.  Sagittal and coronal reformats were performed.    FINDINGS:    LOWER CHEST: Within normal limits.    LIVER: The liver is low in attenuation secondary to hepatic steatosis.   There is no focal mass or intrahepatic biliary duct distention. The right   lobe is 20.5 cm sagittal.  BILE DUCTS: Normal caliber.  GALLBLADDER: Within normal limits.  SPLEEN: Within normal limits.  PANCREAS: Within normal limits.  ADRENALS: Within normal limits.  KIDNEYS/URETERS: Within normal limits.    BLADDER: Within normal limits.  REPRODUCTIVE ORGANS: IUD in the uterus.    BOWEL: No bowel obstruction. Mildly thickened ascending colon. The   appendix is normal.The stomach is decompressed and cannot be assessed.  PERITONEUM: No ascites. Small mesenteric lymph nodes measuring up to 5 mm   in short axis, not enlarged by CT criteria.  VESSELS:  Within normal limits.  RETROPERITONEUM: No lymphadenopathy.    ABDOMINAL WALL: Minimal fat-containing umbilical hernia.  BONES: Within normal limits.    IMPRESSION:     The stomach is decompressed and cannot be assessed. If there is   hematemesis, upper endoscopy is advised.    Small mesenteric lymph nodes.     Hepatic steatosis and hepatomegaly.    Mildly thickened ascending colon suggestive of colitis.    IUD in the uterus.                NICK MOYA M.D., ATTENDING RADIOLOGIST  This document has been electronically signed. May  2 2019  5:12PM

## 2019-05-05 NOTE — PROGRESS NOTE ADULT - PROBLEM SELECTOR PLAN 2
-Patient states she was on atorvastatin (20 mg)   but she was nauseated from it so stopped  -Triglycerides >5000, repeat  coming down .   -STAT ICU consult, possible IV insulin and plasmapheresis but said not a candidate   -Cardio consulted, will start fenofibrate 145 mg daily and fish oil 2g BID  -Patient denying current abdominal pain/nausea    will do mrcp if need as per gi

## 2019-05-05 NOTE — PROGRESS NOTE ADULT - PROBLEM SELECTOR PLAN 1
r/o    going for endo in am by gi dr gandara -Patient Hb dropped from 12.5 to 9.0 now back to 11 , fobt neg   -CT Abdomen/pelvis consistent with colitis, hepatic steatosis, hepatomegaly, mesenteric lymph nodes. No contrast extravasation. Patient started on  iv cipro/flagly , stool cult neg  diarrhea resolved sec to bowl prep and colitis this time   - pt will  switch to po abx in am .   -Zofran q6h PRN nausea  -Continue pepcid 20 mg IV daily, Carafate

## 2019-05-06 VITALS
OXYGEN SATURATION: 100 % | RESPIRATION RATE: 17 BRPM | TEMPERATURE: 97 F | HEART RATE: 70 BPM | DIASTOLIC BLOOD PRESSURE: 80 MMHG | SYSTOLIC BLOOD PRESSURE: 105 MMHG

## 2019-05-06 LAB
ALBUMIN SERPL ELPH-MCNC: 3.3 G/DL — SIGNIFICANT CHANGE UP (ref 3.3–5)
ALP SERPL-CCNC: 78 U/L — SIGNIFICANT CHANGE UP (ref 40–120)
ALT FLD-CCNC: 60 U/L — SIGNIFICANT CHANGE UP (ref 12–78)
ANION GAP SERPL CALC-SCNC: 11 MMOL/L — SIGNIFICANT CHANGE UP (ref 5–17)
AST SERPL-CCNC: 32 U/L — SIGNIFICANT CHANGE UP (ref 15–37)
BILIRUB DIRECT SERPL-MCNC: 0.2 MG/DL — SIGNIFICANT CHANGE UP (ref 0.05–0.2)
BILIRUB INDIRECT FLD-MCNC: 0.4 MG/DL — SIGNIFICANT CHANGE UP (ref 0.2–1)
BILIRUB SERPL-MCNC: 0.6 MG/DL — SIGNIFICANT CHANGE UP (ref 0.2–1.2)
BUN SERPL-MCNC: 10 MG/DL — SIGNIFICANT CHANGE UP (ref 7–23)
CALCIUM SERPL-MCNC: 9.3 MG/DL — SIGNIFICANT CHANGE UP (ref 8.5–10.1)
CHLORIDE SERPL-SCNC: 102 MMOL/L — SIGNIFICANT CHANGE UP (ref 96–108)
CHOLEST SERPL-MCNC: 552 MG/DL — HIGH (ref 10–199)
CO2 SERPL-SCNC: 26 MMOL/L — SIGNIFICANT CHANGE UP (ref 22–31)
CREAT SERPL-MCNC: 0.62 MG/DL — SIGNIFICANT CHANGE UP (ref 0.5–1.3)
DRUG SCREEN, SERUM: SIGNIFICANT CHANGE UP
GLUCOSE SERPL-MCNC: 108 MG/DL — HIGH (ref 70–99)
HCT VFR BLD CALC: 35.7 % — SIGNIFICANT CHANGE UP (ref 34.5–45)
HDLC SERPL-MCNC: 31 MG/DL — LOW
HGB BLD-MCNC: 11.7 G/DL — SIGNIFICANT CHANGE UP (ref 11.5–15.5)
LIPID PNL WITH DIRECT LDL SERPL: SIGNIFICANT CHANGE UP MG/DL
MCHC RBC-ENTMCNC: 32.5 PG — SIGNIFICANT CHANGE UP (ref 27–34)
MCHC RBC-ENTMCNC: 32.8 GM/DL — SIGNIFICANT CHANGE UP (ref 32–36)
MCV RBC AUTO: 99.2 FL — SIGNIFICANT CHANGE UP (ref 80–100)
NRBC # BLD: 0 /100 WBCS — SIGNIFICANT CHANGE UP (ref 0–0)
PLATELET # BLD AUTO: 178 K/UL — SIGNIFICANT CHANGE UP (ref 150–400)
POTASSIUM SERPL-MCNC: 3.7 MMOL/L — SIGNIFICANT CHANGE UP (ref 3.5–5.3)
POTASSIUM SERPL-SCNC: 3.7 MMOL/L — SIGNIFICANT CHANGE UP (ref 3.5–5.3)
PROT SERPL-MCNC: 7 G/DL — SIGNIFICANT CHANGE UP (ref 6–8.3)
RBC # BLD: 3.6 M/UL — LOW (ref 3.8–5.2)
RBC # FLD: 13.9 % — SIGNIFICANT CHANGE UP (ref 10.3–14.5)
SODIUM SERPL-SCNC: 139 MMOL/L — SIGNIFICANT CHANGE UP (ref 135–145)
TOTAL CHOLESTEROL/HDL RATIO MEASUREMENT: 17.8 RATIO — HIGH (ref 3.3–7.1)
TRIGL SERPL-MCNC: 722 MG/DL — HIGH (ref 10–149)
WBC # BLD: 4.2 K/UL — SIGNIFICANT CHANGE UP (ref 3.8–10.5)
WBC # FLD AUTO: 4.2 K/UL — SIGNIFICANT CHANGE UP (ref 3.8–10.5)

## 2019-05-06 PROCEDURE — 96374 THER/PROPH/DIAG INJ IV PUSH: CPT | Mod: XU

## 2019-05-06 PROCEDURE — 85730 THROMBOPLASTIN TIME PARTIAL: CPT

## 2019-05-06 PROCEDURE — 87493 C DIFF AMPLIFIED PROBE: CPT

## 2019-05-06 PROCEDURE — 81025 URINE PREGNANCY TEST: CPT

## 2019-05-06 PROCEDURE — 80307 DRUG TEST PRSMV CHEM ANLYZR: CPT

## 2019-05-06 PROCEDURE — 88312 SPECIAL STAINS GROUP 1: CPT

## 2019-05-06 PROCEDURE — 82150 ASSAY OF AMYLASE: CPT

## 2019-05-06 PROCEDURE — 86901 BLOOD TYPING SEROLOGIC RH(D): CPT

## 2019-05-06 PROCEDURE — 80061 LIPID PANEL: CPT

## 2019-05-06 PROCEDURE — 36415 COLL VENOUS BLD VENIPUNCTURE: CPT

## 2019-05-06 PROCEDURE — 88312 SPECIAL STAINS GROUP 1: CPT | Mod: 26

## 2019-05-06 PROCEDURE — 82272 OCCULT BLD FECES 1-3 TESTS: CPT

## 2019-05-06 PROCEDURE — 88313 SPECIAL STAINS GROUP 2: CPT | Mod: 26

## 2019-05-06 PROCEDURE — 99238 HOSP IP/OBS DSCHRG MGMT 30/<: CPT | Mod: GC

## 2019-05-06 PROCEDURE — 87631 RESP VIRUS 3-5 TARGETS: CPT

## 2019-05-06 PROCEDURE — 80076 HEPATIC FUNCTION PANEL: CPT

## 2019-05-06 PROCEDURE — 81001 URINALYSIS AUTO W/SCOPE: CPT

## 2019-05-06 PROCEDURE — 84702 CHORIONIC GONADOTROPIN TEST: CPT

## 2019-05-06 PROCEDURE — 87040 BLOOD CULTURE FOR BACTERIA: CPT

## 2019-05-06 PROCEDURE — 86900 BLOOD TYPING SEROLOGIC ABO: CPT

## 2019-05-06 PROCEDURE — 85027 COMPLETE CBC AUTOMATED: CPT

## 2019-05-06 PROCEDURE — 88305 TISSUE EXAM BY PATHOLOGIST: CPT

## 2019-05-06 PROCEDURE — 93306 TTE W/DOPPLER COMPLETE: CPT

## 2019-05-06 PROCEDURE — 83690 ASSAY OF LIPASE: CPT

## 2019-05-06 PROCEDURE — 83605 ASSAY OF LACTIC ACID: CPT

## 2019-05-06 PROCEDURE — 85610 PROTHROMBIN TIME: CPT

## 2019-05-06 PROCEDURE — 83036 HEMOGLOBIN GLYCOSYLATED A1C: CPT

## 2019-05-06 PROCEDURE — 83735 ASSAY OF MAGNESIUM: CPT

## 2019-05-06 PROCEDURE — 87507 IADNA-DNA/RNA PROBE TQ 12-25: CPT

## 2019-05-06 PROCEDURE — 74177 CT ABD & PELVIS W/CONTRAST: CPT

## 2019-05-06 PROCEDURE — 84100 ASSAY OF PHOSPHORUS: CPT

## 2019-05-06 PROCEDURE — 88305 TISSUE EXAM BY PATHOLOGIST: CPT | Mod: 26

## 2019-05-06 PROCEDURE — 84478 ASSAY OF TRIGLYCERIDES: CPT

## 2019-05-06 PROCEDURE — 82040 ASSAY OF SERUM ALBUMIN: CPT

## 2019-05-06 PROCEDURE — 80048 BASIC METABOLIC PNL TOTAL CA: CPT

## 2019-05-06 PROCEDURE — 80053 COMPREHEN METABOLIC PANEL: CPT

## 2019-05-06 PROCEDURE — 86850 RBC ANTIBODY SCREEN: CPT

## 2019-05-06 PROCEDURE — 99285 EMERGENCY DEPT VISIT HI MDM: CPT | Mod: 25

## 2019-05-06 PROCEDURE — 88313 SPECIAL STAINS GROUP 2: CPT

## 2019-05-06 PROCEDURE — 99232 SBSQ HOSP IP/OBS MODERATE 35: CPT

## 2019-05-06 RX ORDER — CETIRIZINE HYDROCHLORIDE 10 MG/1
1 TABLET ORAL
Qty: 0 | Refills: 0 | COMMUNITY

## 2019-05-06 RX ORDER — OMEGA-3 ACID ETHYL ESTERS 1 G
2 CAPSULE ORAL
Qty: 56 | Refills: 0 | OUTPATIENT
Start: 2019-05-06 | End: 2019-05-19

## 2019-05-06 RX ORDER — FENOFIBRATE,MICRONIZED 130 MG
1 CAPSULE ORAL
Qty: 14 | Refills: 0 | OUTPATIENT
Start: 2019-05-06 | End: 2019-05-19

## 2019-05-06 RX ORDER — ATORVASTATIN CALCIUM 80 MG/1
1 TABLET, FILM COATED ORAL
Qty: 14 | Refills: 0 | OUTPATIENT
Start: 2019-05-06 | End: 2019-05-19

## 2019-05-06 RX ORDER — LACTOBACILLUS ACIDOPHILUS 100MM CELL
1 CAPSULE ORAL
Qty: 42 | Refills: 0 | OUTPATIENT
Start: 2019-05-06 | End: 2019-05-19

## 2019-05-06 RX ORDER — CIPROFLOXACIN LACTATE 400MG/40ML
1 VIAL (ML) INTRAVENOUS
Qty: 4 | Refills: 0 | OUTPATIENT
Start: 2019-05-06 | End: 2019-05-07

## 2019-05-06 RX ORDER — ASPIRIN/ACETAMINOPHEN/CAFFEINE 250-250-65
15 TABLET ORAL
Qty: 0 | Refills: 0 | COMMUNITY

## 2019-05-06 RX ORDER — METRONIDAZOLE 500 MG
1 TABLET ORAL
Qty: 6 | Refills: 0 | OUTPATIENT
Start: 2019-05-06 | End: 2019-05-07

## 2019-05-06 RX ORDER — GEMFIBROZIL 600 MG
1 TABLET ORAL
Qty: 28 | Refills: 0 | OUTPATIENT
Start: 2019-05-06 | End: 2019-05-19

## 2019-05-06 RX ADMIN — Medication 600 MILLIGRAM(S): at 06:14

## 2019-05-06 RX ADMIN — Medication 500 MILLIGRAM(S): at 06:14

## 2019-05-06 RX ADMIN — Medication 2 GRAM(S): at 06:18

## 2019-05-06 RX ADMIN — Medication 500 MILLIGRAM(S): at 17:22

## 2019-05-06 RX ADMIN — Medication 1 TABLET(S): at 06:14

## 2019-05-06 RX ADMIN — Medication 145 MILLIGRAM(S): at 11:34

## 2019-05-06 RX ADMIN — FAMOTIDINE 20 MILLIGRAM(S): 10 INJECTION INTRAVENOUS at 06:14

## 2019-05-06 NOTE — PROGRESS NOTE ADULT - SUBJECTIVE AND OBJECTIVE BOX
[facility] -- Adolfo Nicholson, Jorge, Chau, Phyllis, Leonora Castillo  Office # [phone]    Follow Up:  Preop Eval    Subjective/Observations: Ambulating in the BR, no c/o abdominal pain, nausea or vomiting.  Denies any form of bleeding.  No respiratory or cardiac discomfort    REVIEW OF SYSTEMS: All other review of systems is negative unless indicated above    PAST MEDICAL & SURGICAL HISTORY:  Normal vaginal delivery: 3  High triglycerides  Seasonal allergies  No significant past surgical history    MEDICATIONS  (STANDING):  atorvastatin 40 milliGRAM(s) Oral at bedtime  ciprofloxacin     Tablet 500 milliGRAM(s) Oral every 12 hours  famotidine Injectable 20 milliGRAM(s) IV Push two times a day  fenofibrate Tablet 145 milliGRAM(s) Oral daily  gemfibrozil 600 milliGRAM(s) Oral two times a day  lactobacillus acidophilus 1 Tablet(s) Oral three times a day  melatonin 5 milliGRAM(s) Oral at bedtime  metroNIDAZOLE    Tablet 500 milliGRAM(s) Oral every 8 hours  omega-3-Acid Ethyl Esters 2 Gram(s) Oral two times a day  sodium chloride 0.9%. 1000 milliLiter(s) (100 mL/Hr) IV Continuous <Continuous>  sucralfate 1 Gram(s) Oral two times a day    MEDICATIONS  (PRN):  acetaminophen   Tablet .. 650 milliGRAM(s) Oral every 6 hours PRN Temp greater or equal to 38C (100.4F)  acetaminophen   Tablet .. 650 milliGRAM(s) Oral every 6 hours PRN Mild Pain (1 - 3)  ondansetron Injectable 4 milliGRAM(s) IV Push every 6 hours PRN Nausea and/or Vomiting    Allergies    No Known Allergies    Intolerances    Vital Signs Last 24 Hrs  T(C): 36.3 (06 May 2019 13:44), Max: 36.6 (06 May 2019 06:00)  T(F): 97.3 (06 May 2019 13:44), Max: 97.9 (06 May 2019 06:00)  HR: 70 (06 May 2019 13:44) (63 - 82)  BP: 105/80 (06 May 2019 13:44) (98/64 - 117/83)  BP(mean): --  RR: 17 (06 May 2019 13:44) (16 - 18)  SpO2: 100% (06 May 2019 13:44) (99% - 100%)    I&O's Summary    05 May 2019 07:01  -  06 May 2019 07:00  --------------------------------------------------------  IN: 550 mL / OUT: 0 mL / NET: 550 mL    PHYSICAL EXAM:  TELE: Not on tele  Constitutional: NAD, awake and alert, well-developed  HEENT: Moist Mucous Membranes, Anicteric  Pulmonary: Non-labored, breath sounds are clear bilaterally, No wheezing, rales or rhonchi  Cardiovascular: Regular, S1 and S2, No murmurs, rubs, gallops or clicks  Gastrointestinal: Bowel Sounds present, soft, nontender.   Lymph: No peripheral edema. No lymphadenopathy.  Skin: No visible rashes or ulcers.  Psych:  Mood & affect appropriate    LABS: All Labs Reviewed:                        11.7   4.20  )-----------( 178      ( 06 May 2019 06:50 )             35.7                         11.5   3.90  )-----------( 164      ( 05 May 2019 12:37 )             34.8                         11.6   4.07  )-----------( 162      ( 04 May 2019 07:28 )             34.7     06 May 2019 06:50    139    |  102    |  10     ----------------------------<  108    3.7     |  26     |  0.62   05 May 2019 12:37    139    |  105    |  6      ----------------------------<  125    3.9     |  27     |  0.60   04 May 2019 07:28    135    |  101    |  4      ----------------------------<  140    3.5     |  24     |  0.54     Ca    9.3        06 May 2019 06:50  Ca    8.9        05 May 2019 12:37  Ca    8.3        04 May 2019 07:28    TPro  7.0    /  Alb  3.3    /  TBili  0.6    /  DBili  .20    /  AST  32     /  ALT  60     /  AlkPhos  78     06 May 2019 06:50  TPro  7.2    /  Alb  3.4    /  TBili  0.7    /  DBili  .20    /  AST  43     /  ALT  66     /  AlkPhos  70     05 May 2019 12:37  TPro  7.0    /  Alb  3.2    /  TBili  0.7    /  DBili  <.10   /  AST  49     /  ALT  65     /  AlkPhos  73     04 May 2019 07:28      < from: CT Abdomen and Pelvis w/ Oral Cont and w/ IV Cont (05.02.19 @ 17:00) >    EXAM:  CT ABDOMEN AND PELVIS OC IC                            PROCEDURE DATE:  05/02/2019          INTERPRETATION:  CLINICAL HISTORY: 38 years  Female with mid abd pain.   Spitting up blood.    COMPARISON: None.    PROCEDURE:   CT of the Abdomen and Pelvis was performed with intravenous contrast.   Intravenous contrast: 100 ml Omnipaque 350. 0 ml discarded.  Oral contrast: positive contrast was administered.  Sagittal and coronal reformats were performed.    FINDINGS:    LOWER CHEST: Within normal limits.    LIVER: The liver is low in attenuation secondary to hepatic steatosis.   There is no focal mass or intrahepatic biliary duct distention. The right   lobe is 20.5 cm sagittal.  BILE DUCTS: Normal caliber.  GALLBLADDER: Within normal limits.  SPLEEN: Within normal limits.  PANCREAS: Within normal limits.  ADRENALS: Within normal limits.  KIDNEYS/URETERS: Within normal limits.    BLADDER: Within normal limits.  REPRODUCTIVE ORGANS: IUD in the uterus.    BOWEL: No bowel obstruction. Mildly thickened ascending colon. The   appendix is normal.The stomach is decompressed and cannot be assessed.  PERITONEUM: No ascites. Small mesenteric lymph nodes measuring up to 5 mm   in short axis, not enlarged by CT criteria.  VESSELS:  Within normal limits.  RETROPERITONEUM: No lymphadenopathy.    ABDOMINAL WALL: Minimal fat-containing umbilical hernia.  BONES: Within normal limits.    IMPRESSION:     The stomach is decompressed and cannot be assessed. If there is   hematemesis, upper endoscopy is advised.    Small mesenteric lymph nodes.     Hepatic steatosis and hepatomegaly.    Mildly thickened ascending colon suggestive of colitis.    IUD in the uterus.    NICK OMYA M.D., ATTENDING RADIOLOGIST  This document has been electronically signed. May  2 2019  5:12PM      < end of copied text >    < from: TTE Echo Doppler w/o Cont (05.05.19 @ 13:55) >     EXAM:  ECHO TTE WO CON COMP W DOPPLR         PROCEDURE DATE:  05/05/2019        INTERPRETATION:  INDICATION: Preop    Blood Pressure 102/71    Height 152 cm     Weight 56.7 kg       BSA 1.53   sq m    Dimensions:    LA 2.9       Normal Values: 2.0- 4.0 cm    Ao 3.2        Normal Values: 2.0 - 3.8 cm  SEPTUM 1.0       Normal Values: 0.6 - 1.2 cm  PWT 0.8       Normal Values: 0.6 - 1.1 cm  LVIDd 3.7         Normal Values: 3.0 - 5.6 cm  LVIDs 2.7         Normal Values: 1.8 - 4.0 cm      OBSERVATIONS:    Mitral Valve: normal, trace physiologic MR.  Aortic Valve/Aorta: normal trileaflet aortic valve.  Tricuspid Valve: normal with trace TR.  Pulmonic Valve: Trace PI  Left Atrium: normal  Right Atrium: normal  Left Ventricle: normal LV size and systolic function, estimated LVEF of   65-70%.  Right Ventricle: normal size and systolic function.  Pericardium/Pleura: normal, no significant pericardial effusion.  Pulmonary/RV Pressure: estimated PA systolic pressure of 20 mmHg assuming   an RA pressure of 10 mmHg.  Left ventricular diastolic dysfunction is present    Conclusion:   Normal left ventricular internal dimensions and systolic function,   estimated LVEF of 65-70 %.    Normal RV size and systolic function.   Normal biatrial size.    Normal trileaflet aortic valve, without AI.   Trace physiologic MR and TR.    Estimated PA systolic pressure of 20 mmHg. The IVC is normal in size.    No significant pericardial effusion.    MICHELLE GALLAGHER   This document has been electronically signed. May  6 2019 11:13AM      < end of copied text >

## 2019-05-06 NOTE — PROGRESS NOTE ADULT - PROVIDER SPECIALTY LIST ADULT
Cardiology
Gastroenterology
Hospitalist
Hospitalist
Gastroenterology
Hospitalist

## 2019-05-06 NOTE — PROGRESS NOTE ADULT - ATTENDING COMMENTS
I personally saw and examined the patient in detail.  I have spoken to the above provider regarding the assessment and plan of care.  I reviewed the above assessment and plan of care, and agree.  I have made changes in the body of the note where appropriate.
I saw and examined the patient personally. Spoke with above provider regarding this case. I reviewed the above findings completely.  I agree with the above history, physical, and plan which I have edited where appropriate.
I saw and examined the patient personally. Spoke with above provider regarding this case. I reviewed the above findings completely.  I agree with the above history, physical, and plan which I have edited where appropriate.   Hypertrigs better but still extremely high. High risk for pancreatitis. Aggressive lipid control. Start Gemfibrozil. If lfts ok start Lipitor 40mg HS. Daily lipids.
Advanced care planning was discussed with patient and family.  Advanced care planning forms were reviewed and discussed.  Risks, benefits and alternatives of gastroenterologic procedures were discussed in detail and all questions were answered.    30 minutes spent.
pt seen and examine see above plan -admitted for GI bleed   with hx severe hypertriglyceridemia   -   Monday endoscopy  on ppi.  diarrhea  with colitis   in cipro iv , iv flagyl    stool cult neg .
pt seen and examine see above plan -admitted for r/o  GI bleed   with hx severe hypertriglyceridemia   -   Monday endoscopy  on ppi , npo except meds , medically optimize for endoscopy no current contraindication  - fu electrolyte in am.   diarrhea  with colitis   resolved  symptom -  in cipro iv , iv flagyl   switch to po abx  in am    stool cult neg .
pt seen and examine see above plan -admitted for GI bleed   with hx severe hypertriglyceridemia .

## 2019-05-06 NOTE — DISCHARGE NOTE NURSING/CASE MANAGEMENT/SOCIAL WORK - [email or web address]_GEN_ALL_CORE
You can access the [email or web address] Patient Portal, offered by [facility], by registering with the following website: [email or web address]

## 2019-05-06 NOTE — PROGRESS NOTE ADULT - ASSESSMENT
38 F PMHx of elevated triglycerides presents to ED c/o of abdominal pain admitted for GI bleed.     - No clear evidence of acute ischemia  - No evidence of volume overload  - No acute changes on EKG, was sinus tachycardia   - Tachy was likely reactive to dehydration  - TTE done, normal LVF with no valvular disease    Hypertriglyredemia  - Triglyceride improved significantly (722 <-- 3700 <--4232)  - Continue Fenofibrate 145 mg PO Daily and fish oil 2 grams BID  - Continue gemfibrozil 600 mg twice daily   - Continue Lipitor 40mg HS    GIB  - s/p EGD, found to have hiatal hernia  - Follow GI recs    Awaiting discharge.  Will follow with Dr. Jonathan Medina NP  Cardiology

## 2019-05-07 LAB
CULTURE RESULTS: SIGNIFICANT CHANGE UP
CULTURE RESULTS: SIGNIFICANT CHANGE UP
SPECIMEN SOURCE: SIGNIFICANT CHANGE UP
SPECIMEN SOURCE: SIGNIFICANT CHANGE UP

## 2024-03-29 NOTE — ED ADULT NURSE NOTE - CAS TRG GEN SKIN CONDITION
Medication Therapy Management (MTM) Encounter    ASSESSMENT:                            Medication Adherence/Access: See below for considerations    Type 2 Diabetes: A1C below goal of < 7%, but started Mounjaro in January and replacement of Rybelsus to help remove need for glipizide.  Patient was able to stop glipizide and is tolerating Mounjaro well, but does note higher sugars from baseline.  Her time in range is still greater than 70%.  Given tolerating Mounjaro well, would benefit from dose increase today to help improve time in range and achieve weight loss goals.    Hypertension: Last clinic-reported BP above JNC8 goal of < 140/90, continue to monitor and titrate olmesartan as needed in future encounters    Supplements: Stable    PLAN:                            INCREASE Mounjaro to 5 mg weekly. Can increase to 7.5 mg as appropriate in 4 weeks with Cait Arceo PA-C    Endocrine Team & Next Follow-Up:  4/25/2024 with Cait Arceo PA-C   5/20/2024 with Sreedhar    SUBJECTIVE/OBJECTIVE:                          Sandy Person is a 43 year old female called for an initial visit. She was referred to me from Cait Arceo PA-C.      Reason for visit: Medication Therapy Management (MTM).      Allergies/ADRs: Reviewed in chart  Past Medical History: Reviewed in chart  Social History     Tobacco Use    Smoking status: Never     Passive exposure: Past    Smokeless tobacco: Never   Vaping Use    Vaping Use: Never used   Substance Use Topics    Alcohol use: Not Currently     Comment: 1-2 drinks per month    Drug use: Never        Medication Adherence/Access: Adherence is reflected well in the dispense report.     Type 2 Diabetes:   Diabetes Medication(s)       Biguanides       metFORMIN (GLUCOPHAGE) 500 MG tablet TAKE 2 TABLETS BY MOUTH TWICE DAILY       Incretin Mimetic Agents       tirzepatide (MOUNJARO) 2.5 MG/0.5ML pen Inject 2.5 mg Subcutaneous every 7 days -- 6 weeks so far. Tolerating well. Historically on Rybelsus 7 mg +  glipizide, but these were stopped when Mounjaro was started            Blood sugar monitoring:       Lifestyle: Very regimented diet (high protein), now struggling with carbs   ACEi/ARB: Yes: olmesartan.   Urine Albumin:   Lab Results   Component Value Date    UMALCR  10/17/2023      Comment:      Unable to calculate, urine albumin and/or urine creatinine is outside detectable limits.  Microalbuminuria is defined as an albumin:creatinine ratio of 17 to 299 for males and 25 to 299 for females. A ratio of albumin:creatinine of 300 or higher is indicative of overt proteinuria.  Due to biologic variability, positive results should be confirmed by a second, first-morning random or 24-hour timed urine specimen. If there is discrepancy, a third specimen is recommended. When 2 out of 3 results are in the microalbuminuria range, this is evidence for incipient nephropathy and warrants increased efforts at glucose control, blood pressure control, and institution of therapy with an angiotensin-converting-enzyme (ACE) inhibitor (if the patient can tolerate it).      UMALCR 12.07 03/06/2022      Lab Results   Component Value Date    A1C 6.2 03/08/2024    A1C 5.9 12/05/2023    A1C 6.0 05/17/2023    A1C 5.5 01/09/2023    A1C 6.6 11/21/2022     Lab Results   Component Value Date    GFRESTIMATED >90 03/25/2024    GFRESTIMATED >90 10/17/2023    GFRESTIMATED >90 01/30/2023     Most Recent Immunizations   Administered Date(s) Administered    COVID-19 12+ (2023-24) (Pfizer) 12/05/2023    COVID-19 Bivalent 12+ (Pfizer) 12/26/2022    COVID-19 MONOVALENT 12+ (Pfizer) 11/13/2021    Influenza Vaccine >6 months,quad, PF 10/17/2023    Influenza, seasonal, injectable, PF 01/25/2016    Influenza,INJ,MDCK,PF,Quad >6mo(Flucelvax) 11/27/2022    Pneumococcal 23 valent 06/05/2015    TDAP (Adacel,Boostrix) 05/17/2023    Twinrix A/B 02/28/2018    Typhoid IM 02/28/2018      Estimated body mass index is 39.55 kg/m  as calculated from the following:     "Height as of 3/8/24: 5' 3.47\" (1.612 m).    Weight as of 3/8/24: 226 lb 9.6 oz (102.8 kg).     Hypertension: Current medications:   Olmesartan 5 mg daily  No reports of side effects.     BP Readings from Last 6 Encounters:   03/08/24 (!) 142/82   12/05/23 (!) 148/87   09/26/23 (!) 152/91   07/12/23 138/85   05/30/23 126/84   05/17/23 (!) 144/88        Supplements: Current medications:   Vitamin D 1000 units daily  Magnesium 400 mg daily  No reports of side effects.     BP Readings from Last 1 Encounters:   03/08/24 (!) 142/82     Pulse Readings from Last 1 Encounters:   03/08/24 85     Wt Readings from Last 1 Encounters:   03/08/24 226 lb 9.6 oz (102.8 kg)     Ht Readings from Last 1 Encounters:   03/08/24 5' 3.47\" (1.612 m)     Temp Readings from Last 1 Encounters:   03/08/24 98.6  F (37  C) (Temporal)       ----------------      I spent 15 minutes with this patient today. Cait Arceo PA-C was provided the recommendations above via routed note and is the authorizing prescriber for this visit through the pharmacist collaborative practice agreement. A copy of the visit note was provided to the patient's provider(s).    The patient was given to the patient a summary of these recommendations.     Sreedhar Joyner, PharmD, [facility]  Endocrine & Diabetes [facility] Pharmacist  [address] [place] [id number]  Direct Voicemail: [phone]    Telemedicine Visit Details  Type of service:  Telephone visit  Start Time: 8AM  End Time: 8:15AM  Originating Location (pt. Location): Home  Provider has received verbal consent for a visit from the patient? Yes     Medication Therapy Recommendations  Type 2 diabetes mellitus with hyperglycemia, unspecified whether long term insulin use (H)    Current Medication: tirzepatide (MOUNJARO) 2.5 MG/0.5ML pen   Rationale: Dose too low - Dosage too low - Effectiveness   Recommendation: Increase Dose   Status: Accepted per CPA                   " Warm/Dry

## 2024-08-14 ENCOUNTER — EMERGENCY (EMERGENCY)
Facility: HOSPITAL | Age: 44
LOS: 1 days | Discharge: HOME | End: 2024-08-14
Attending: EMERGENCY MEDICINE
Payer: MEDICAID

## 2024-08-14 VITALS
WEIGHT: 106.92 LBS | DIASTOLIC BLOOD PRESSURE: 84 MMHG | HEIGHT: 64 IN | TEMPERATURE: 98 F | RESPIRATION RATE: 18 BRPM | HEART RATE: 108 BPM | OXYGEN SATURATION: 99 % | SYSTOLIC BLOOD PRESSURE: 118 MMHG

## 2024-08-14 VITALS — HEART RATE: 94 BPM

## 2024-08-14 PROBLEM — J30.2 OTHER SEASONAL ALLERGIC RHINITIS: Chronic | Status: ACTIVE | Noted: 2019-05-02

## 2024-08-14 PROBLEM — E78.1 PURE HYPERGLYCERIDEMIA: Chronic | Status: ACTIVE | Noted: 2019-05-02

## 2024-08-14 LAB
ALBUMIN SERPL ELPH-MCNC: 3.8 G/DL — SIGNIFICANT CHANGE UP (ref 3.5–5)
ALP SERPL-CCNC: 78 U/L — SIGNIFICANT CHANGE UP (ref 40–120)
ALT FLD-CCNC: 29 U/L DA — SIGNIFICANT CHANGE UP (ref 10–60)
AMPHET UR-MCNC: NEGATIVE — SIGNIFICANT CHANGE UP
ANION GAP SERPL CALC-SCNC: 10 MMOL/L — SIGNIFICANT CHANGE UP (ref 5–17)
APAP SERPL-MCNC: <2 UG/ML — LOW (ref 10–30)
AST SERPL-CCNC: 28 U/L — SIGNIFICANT CHANGE UP (ref 10–40)
BARBITURATES UR SCN-MCNC: NEGATIVE — SIGNIFICANT CHANGE UP
BASOPHILS # BLD AUTO: 0.07 K/UL — SIGNIFICANT CHANGE UP (ref 0–0.2)
BASOPHILS NFR BLD AUTO: 0.5 % — SIGNIFICANT CHANGE UP (ref 0–2)
BENZODIAZ UR-MCNC: NEGATIVE — SIGNIFICANT CHANGE UP
BILIRUB SERPL-MCNC: 0.5 MG/DL — SIGNIFICANT CHANGE UP (ref 0.2–1.2)
BUN SERPL-MCNC: 23 MG/DL — HIGH (ref 7–18)
CALCIUM SERPL-MCNC: 8.1 MG/DL — LOW (ref 8.4–10.5)
CHLORIDE SERPL-SCNC: 109 MMOL/L — HIGH (ref 96–108)
CO2 SERPL-SCNC: 20 MMOL/L — LOW (ref 22–31)
COCAINE METAB.OTHER UR-MCNC: NEGATIVE — SIGNIFICANT CHANGE UP
CREAT SERPL-MCNC: 0.59 MG/DL — SIGNIFICANT CHANGE UP (ref 0.5–1.3)
EGFR: 115 ML/MIN/1.73M2 — SIGNIFICANT CHANGE UP
EOSINOPHIL # BLD AUTO: 0.43 K/UL — SIGNIFICANT CHANGE UP (ref 0–0.5)
EOSINOPHIL NFR BLD AUTO: 3 % — SIGNIFICANT CHANGE UP (ref 0–6)
ETHANOL SERPL-MCNC: 357 MG/DL — HIGH (ref 0–10)
GLUCOSE SERPL-MCNC: 95 MG/DL — SIGNIFICANT CHANGE UP (ref 70–99)
HCG SERPL-ACNC: <1 MIU/ML — SIGNIFICANT CHANGE UP
HCT VFR BLD CALC: 39.3 % — SIGNIFICANT CHANGE UP (ref 34.5–45)
HGB BLD-MCNC: 13.3 G/DL — SIGNIFICANT CHANGE UP (ref 11.5–15.5)
IMM GRANULOCYTES NFR BLD AUTO: 0.4 % — SIGNIFICANT CHANGE UP (ref 0–0.9)
LIDOCAIN IGE QN: 64 U/L — SIGNIFICANT CHANGE UP (ref 13–75)
LYMPHOCYTES # BLD AUTO: 1.94 K/UL — SIGNIFICANT CHANGE UP (ref 1–3.3)
LYMPHOCYTES # BLD AUTO: 13.7 % — SIGNIFICANT CHANGE UP (ref 13–44)
MCHC RBC-ENTMCNC: 31.6 PG — SIGNIFICANT CHANGE UP (ref 27–34)
MCHC RBC-ENTMCNC: 33.8 GM/DL — SIGNIFICANT CHANGE UP (ref 32–36)
MCV RBC AUTO: 93.3 FL — SIGNIFICANT CHANGE UP (ref 80–100)
METHADONE UR-MCNC: NEGATIVE — SIGNIFICANT CHANGE UP
MONOCYTES # BLD AUTO: 0.54 K/UL — SIGNIFICANT CHANGE UP (ref 0–0.9)
MONOCYTES NFR BLD AUTO: 3.8 % — SIGNIFICANT CHANGE UP (ref 2–14)
NEUTROPHILS # BLD AUTO: 11.1 K/UL — HIGH (ref 1.8–7.4)
NEUTROPHILS NFR BLD AUTO: 78.6 % — HIGH (ref 43–77)
NRBC # BLD: 0 /100 WBCS — SIGNIFICANT CHANGE UP (ref 0–0)
OPIATES UR-MCNC: NEGATIVE — SIGNIFICANT CHANGE UP
PCP SPEC-MCNC: SIGNIFICANT CHANGE UP
PCP UR-MCNC: NEGATIVE — SIGNIFICANT CHANGE UP
PLATELET # BLD AUTO: 263 K/UL — SIGNIFICANT CHANGE UP (ref 150–400)
POTASSIUM SERPL-MCNC: 3.9 MMOL/L — SIGNIFICANT CHANGE UP (ref 3.5–5.3)
POTASSIUM SERPL-SCNC: 3.9 MMOL/L — SIGNIFICANT CHANGE UP (ref 3.5–5.3)
PROT SERPL-MCNC: 7.6 G/DL — SIGNIFICANT CHANGE UP (ref 6–8.3)
RBC # BLD: 4.21 M/UL — SIGNIFICANT CHANGE UP (ref 3.8–5.2)
RBC # FLD: 13.6 % — SIGNIFICANT CHANGE UP (ref 10.3–14.5)
SALICYLATES SERPL-MCNC: 1.9 MG/DL — LOW (ref 2.8–20)
SODIUM SERPL-SCNC: 139 MMOL/L — SIGNIFICANT CHANGE UP (ref 135–145)
THC UR QL: NEGATIVE — SIGNIFICANT CHANGE UP
WBC # BLD: 14.13 K/UL — HIGH (ref 3.8–10.5)
WBC # FLD AUTO: 14.13 K/UL — HIGH (ref 3.8–10.5)

## 2024-08-14 PROCEDURE — 85025 COMPLETE CBC W/AUTO DIFF WBC: CPT

## 2024-08-14 PROCEDURE — 96375 TX/PRO/DX INJ NEW DRUG ADDON: CPT

## 2024-08-14 PROCEDURE — 96374 THER/PROPH/DIAG INJ IV PUSH: CPT

## 2024-08-14 PROCEDURE — 80053 COMPREHEN METABOLIC PANEL: CPT

## 2024-08-14 PROCEDURE — 99291 CRITICAL CARE FIRST HOUR: CPT | Mod: 25

## 2024-08-14 PROCEDURE — 36415 COLL VENOUS BLD VENIPUNCTURE: CPT

## 2024-08-14 PROCEDURE — 84702 CHORIONIC GONADOTROPIN TEST: CPT

## 2024-08-14 PROCEDURE — 80307 DRUG TEST PRSMV CHEM ANLYZR: CPT

## 2024-08-14 PROCEDURE — 83690 ASSAY OF LIPASE: CPT

## 2024-08-14 PROCEDURE — 82962 GLUCOSE BLOOD TEST: CPT

## 2024-08-14 RX ORDER — METHYLPREDNISOLONE ACETATE 40 MG/ML
125 INJECTION, SUSPENSION INTRA-ARTICULAR; INTRALESIONAL; INTRAMUSCULAR; INTRASYNOVIAL; SOFT TISSUE ONCE
Refills: 0 | Status: COMPLETED | OUTPATIENT
Start: 2024-08-14 | End: 2024-08-14

## 2024-08-14 RX ORDER — BACTERIOSTATIC SODIUM CHLORIDE 0.9 %
1000 VIAL (ML) INJECTION ONCE
Refills: 0 | Status: COMPLETED | OUTPATIENT
Start: 2024-08-14 | End: 2024-08-14

## 2024-08-14 RX ORDER — LORATADINE 10 MG
1 TABLET ORAL
Qty: 6 | Refills: 0
Start: 2024-08-14 | End: 2024-08-16

## 2024-08-14 RX ORDER — EPINEPHRINE 1 MG/ML
0.3 VIAL (ML) INJECTION
Qty: 1 | Refills: 0
Start: 2024-08-14

## 2024-08-14 RX ORDER — FAMOTIDINE 40 MG/1
20 TABLET, FILM COATED ORAL ONCE
Refills: 0 | Status: COMPLETED | OUTPATIENT
Start: 2024-08-14 | End: 2024-08-14

## 2024-08-14 RX ORDER — ACETAMINOPHEN 500 MG
725 TABLET ORAL ONCE
Refills: 0 | Status: COMPLETED | OUTPATIENT
Start: 2024-08-14 | End: 2024-08-14

## 2024-08-14 RX ORDER — DIPHENHYDRAMINE HCL 25 MG
25 CAPSULE ORAL ONCE
Refills: 0 | Status: COMPLETED | OUTPATIENT
Start: 2024-08-14 | End: 2024-08-14

## 2024-08-14 RX ORDER — ONDANSETRON HCL/PF 4 MG/2 ML
4 VIAL (ML) INJECTION ONCE
Refills: 0 | Status: COMPLETED | OUTPATIENT
Start: 2024-08-14 | End: 2024-08-14

## 2024-08-14 RX ORDER — PREDNISONE 10 MG/1
1 TABLET ORAL
Qty: 3 | Refills: 0
Start: 2024-08-14 | End: 2024-08-16

## 2024-08-14 RX ORDER — EPINEPHRINE 1 MG/ML
0.3 VIAL (ML) INJECTION ONCE
Refills: 0 | Status: COMPLETED | OUTPATIENT
Start: 2024-08-14 | End: 2024-08-14

## 2024-08-14 RX ORDER — KETOROLAC TROMETHAMINE 10 MG
15 TABLET ORAL ONCE
Refills: 0 | Status: DISCONTINUED | OUTPATIENT
Start: 2024-08-14 | End: 2024-08-14

## 2024-08-14 RX ADMIN — Medication 25 MILLIGRAM(S): at 11:26

## 2024-08-14 RX ADMIN — Medication 0.3 MILLIGRAM(S): at 11:23

## 2024-08-14 RX ADMIN — METHYLPREDNISOLONE ACETATE 125 MILLIGRAM(S): 40 INJECTION, SUSPENSION INTRA-ARTICULAR; INTRALESIONAL; INTRAMUSCULAR; INTRASYNOVIAL; SOFT TISSUE at 11:11

## 2024-08-14 RX ADMIN — Medication 4 MILLIGRAM(S): at 08:31

## 2024-08-14 RX ADMIN — Medication 25 MILLIGRAM(S): at 11:11

## 2024-08-14 RX ADMIN — Medication 725 MILLIGRAM(S): at 14:01

## 2024-08-14 RX ADMIN — Medication 15 MILLIGRAM(S): at 14:01

## 2024-08-14 RX ADMIN — Medication 290 MILLIGRAM(S): at 06:44

## 2024-08-14 RX ADMIN — Medication 1000 MILLILITER(S): at 08:30

## 2024-08-14 RX ADMIN — Medication 15 MILLIGRAM(S): at 08:31

## 2024-08-14 RX ADMIN — Medication 1000 MILLILITER(S): at 06:25

## 2024-08-14 RX ADMIN — FAMOTIDINE 20 MILLIGRAM(S): 40 TABLET, FILM COATED ORAL at 11:11

## 2024-08-14 NOTE — ED PROVIDER NOTE - PHYSICAL EXAMINATION
Gen: no acute distress  Head: normocephalic, atraumatic  Lung: CTAB, no respiratory distress, no wheezing, rales, rhonchi  CV: normal s1/s2, rrr,   Abd: soft, non-tender, non-distended  MSK: No edema, no visible deformities, full range of motion in all 4 extremities  Neuro: No focal neurologic deficits. Clinically sober. PERRLA, EOMI, CN 2-12 intact

## 2024-08-14 NOTE — ED PROVIDER NOTE - PROGRESS NOTE DETAILS
43-year-old female  who presented with headache after consuming alcohol.  Patient care signed out pending laboratories and reassessment.  Her laboratories were significant for alcohol level of 357.  Upon my physical exam and history taking the patient complained of headache and nausea.  We administered fluids, Toradol and Zofran.  Patient also consumed a meal.  Shortly after this time, the patient was noted to have periorbital swelling, hive-like rash on the left arm and stated that she felt as if her throat was closing.  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  patient received epinephrine, Pepcid, Benadryl, Solu-Medrol.  She was observed for over 3 hours with clinical improvement of her symptoms.  She was hemodynamically stable and with a steady gait on reassessment.

## 2024-08-14 NOTE — ED PROVIDER NOTE - PATIENT PORTAL LINK FT
You can access the FollowMyHealth Patient Portal offered by [facility] by registering at the following website: [email or web address]. By joining [employer]’s FollowMyHealth portal, you will also be able to view your health information using other applications (apps) compatible with our system.

## 2024-08-14 NOTE — ED ADULT TRIAGE NOTE - BMI (KG/M2)
04/25/19                            Aggie Suarez  [address] [address]    To Whom It May Concern:    This is to certify Aggie Suarez was evaluated with [facility] on 04/25/19 and can return to school on 4/29/19.   Please excuse Aggie from school 4/25/19 and 4/26/19 due to illness.  RESTRICTIONS: None      AVINASH Norman              [facility]  [facility] [address] [address]  Phone: [phone]                     18.4
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
## 2024-08-14 NOTE — ED PROVIDER NOTE - NSFOLLOWUPINSTRUCTIONS_ED_ALL_ED_FT
You presented  with headache and body aches after drinking alcohol.  Your alcohol level was significantly elevated at 357.  You received fluids, pain medication and antinausea medication.  At some point during your stay, you developed an allergic reaction which required epinephrine, steroids and antihistamines.  You were observed for several hours with improvement of your symptoms.  Please follow-up with allergist,  follow-up information provided below.   Take medication as prescribed.  Use EpiPen as needed for symptoms consistent with allergic reaction that you had in the emergency department.  Please reduce your alcohol consumption.    [facility] [place]  [phone]  [address] [place] [address], [id number] [place] [address]

## 2024-08-14 NOTE — ED PROVIDER NOTE - CLINICAL SUMMARY MEDICAL DECISION MAKING FREE TEXT BOX
Patient is a 42 yo female with no significant PMHx presenting with migraine and body aches after drinking alcohol. VSS well appearing no FND moving all extremities equally.  - Will check labs r.o electrolyte abnormalities and pancreatitis, BAL UDS to evaluate intox, hydrate, control pain, reassess.

## 2024-08-14 NOTE — ED PROVIDER NOTE - ATTENDING CONTRIBUTION TO CARE
The patient's condition is critical. Management options were put in place to ensure further deterioration does not occur. In addition to the usual care provided, I have spent additional time with this patient through but not limited to the following: additional documentation  reviewing test results,  discussing with patient / patient's family prognosis and course of care,  reassessment of patient's status and response to interventions.
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
## 2024-11-20 NOTE — DISCHARGE NOTE NURSING/CASE MANAGEMENT/SOCIAL WORK - NSTOBACCONEVERSMOKERY/N_GEN_A
Patient called in regard to appt today virtual 11/20/24 she cannot get link for appt. Provider notified and provider will reach out to patient. Writer informed patient she understood.    Yes

## 2025-03-28 NOTE — DIETITIAN INITIAL EVALUATION ADULT. - FEEDING SKILL
independent
Alert-The patient is alert, awake and responds to voice. The patient is oriented to time, place, and person. The triage nurse is able to obtain subjective information.